# Patient Record
Sex: FEMALE | Race: BLACK OR AFRICAN AMERICAN | NOT HISPANIC OR LATINO | Employment: FULL TIME | URBAN - METROPOLITAN AREA
[De-identification: names, ages, dates, MRNs, and addresses within clinical notes are randomized per-mention and may not be internally consistent; named-entity substitution may affect disease eponyms.]

---

## 2020-09-06 ENCOUNTER — HOSPITAL ENCOUNTER (EMERGENCY)
Facility: HOSPITAL | Age: 65
Discharge: HOME | End: 2020-09-06
Attending: EMERGENCY MEDICINE
Payer: COMMERCIAL

## 2020-09-06 VITALS
TEMPERATURE: 98 F | SYSTOLIC BLOOD PRESSURE: 118 MMHG | DIASTOLIC BLOOD PRESSURE: 72 MMHG | HEART RATE: 68 BPM | WEIGHT: 170 LBS | BODY MASS INDEX: 26.68 KG/M2 | HEIGHT: 67 IN | RESPIRATION RATE: 22 BRPM | OXYGEN SATURATION: 95 %

## 2020-09-06 DIAGNOSIS — U07.1 COVID-19 VIRUS INFECTION: Primary | ICD-10-CM

## 2020-09-06 LAB
ALBUMIN SERPL BCP-MCNC: 3.7 G/DL (ref 3.5–5.2)
ALP SERPL-CCNC: 60 U/L (ref 55–135)
ALT SERPL W/O P-5'-P-CCNC: 29 U/L (ref 10–44)
ANION GAP SERPL CALC-SCNC: 14 MMOL/L (ref 8–16)
AST SERPL-CCNC: 37 U/L (ref 10–40)
BASOPHILS # BLD AUTO: 0.01 K/UL (ref 0–0.2)
BASOPHILS NFR BLD: 0.3 % (ref 0–1.9)
BILIRUB SERPL-MCNC: 0.3 MG/DL (ref 0.1–1)
BUN SERPL-MCNC: 16 MG/DL (ref 8–23)
CALCIUM SERPL-MCNC: 9 MG/DL (ref 8.7–10.5)
CHLORIDE SERPL-SCNC: 99 MMOL/L (ref 95–110)
CK SERPL-CCNC: 135 U/L (ref 20–180)
CO2 SERPL-SCNC: 27 MMOL/L (ref 23–29)
CREAT SERPL-MCNC: 1.1 MG/DL (ref 0.5–1.4)
CRP SERPL-MCNC: 69.8 MG/L (ref 0–8.2)
DIFFERENTIAL METHOD: ABNORMAL
EOSINOPHIL # BLD AUTO: 0 K/UL (ref 0–0.5)
EOSINOPHIL NFR BLD: 0 % (ref 0–8)
ERYTHROCYTE [DISTWIDTH] IN BLOOD BY AUTOMATED COUNT: 14.2 % (ref 11.5–14.5)
EST. GFR  (AFRICAN AMERICAN): >60 ML/MIN/1.73 M^2
EST. GFR  (NON AFRICAN AMERICAN): 52 ML/MIN/1.73 M^2
FERRITIN SERPL-MCNC: 173 NG/ML (ref 20–300)
GLUCOSE SERPL-MCNC: 105 MG/DL (ref 70–110)
HCT VFR BLD AUTO: 41.1 % (ref 37–48.5)
HCV AB SERPL QL IA: NEGATIVE
HGB BLD-MCNC: 13.3 G/DL (ref 12–16)
IMM GRANULOCYTES # BLD AUTO: 0.01 K/UL (ref 0–0.04)
IMM GRANULOCYTES NFR BLD AUTO: 0.3 % (ref 0–0.5)
LACTATE SERPL-SCNC: 1 MMOL/L (ref 0.5–2.2)
LDH SERPL L TO P-CCNC: 318 U/L (ref 110–260)
LYMPHOCYTES # BLD AUTO: 0.9 K/UL (ref 1–4.8)
LYMPHOCYTES NFR BLD: 25 % (ref 18–48)
MCH RBC QN AUTO: 28.4 PG (ref 27–31)
MCHC RBC AUTO-ENTMCNC: 32.4 G/DL (ref 32–36)
MCV RBC AUTO: 88 FL (ref 82–98)
MONOCYTES # BLD AUTO: 0.2 K/UL (ref 0.3–1)
MONOCYTES NFR BLD: 5.4 % (ref 4–15)
NEUTROPHILS # BLD AUTO: 2.5 K/UL (ref 1.8–7.7)
NEUTROPHILS NFR BLD: 69 % (ref 38–73)
NRBC BLD-RTO: 0 /100 WBC
PLATELET # BLD AUTO: 121 K/UL (ref 150–350)
PLATELET BLD QL SMEAR: ABNORMAL
PMV BLD AUTO: 14.6 FL (ref 9.2–12.9)
POTASSIUM SERPL-SCNC: 3.6 MMOL/L (ref 3.5–5.1)
PROT SERPL-MCNC: 7.4 G/DL (ref 6–8.4)
RBC # BLD AUTO: 4.69 M/UL (ref 4–5.4)
SODIUM SERPL-SCNC: 140 MMOL/L (ref 136–145)
WBC # BLD AUTO: 3.68 K/UL (ref 3.9–12.7)

## 2020-09-06 PROCEDURE — 99285 EMERGENCY DEPT VISIT HI MDM: CPT | Mod: 25

## 2020-09-06 PROCEDURE — 82550 ASSAY OF CK (CPK): CPT

## 2020-09-06 PROCEDURE — 85025 COMPLETE CBC W/AUTO DIFF WBC: CPT

## 2020-09-06 PROCEDURE — 25000003 PHARM REV CODE 250: Performed by: EMERGENCY MEDICINE

## 2020-09-06 PROCEDURE — 94640 AIRWAY INHALATION TREATMENT: CPT

## 2020-09-06 PROCEDURE — 96360 HYDRATION IV INFUSION INIT: CPT

## 2020-09-06 PROCEDURE — 63600175 PHARM REV CODE 636 W HCPCS: Performed by: EMERGENCY MEDICINE

## 2020-09-06 PROCEDURE — 27000221 HC OXYGEN, UP TO 24 HOURS

## 2020-09-06 PROCEDURE — 96372 THER/PROPH/DIAG INJ SC/IM: CPT | Mod: 59

## 2020-09-06 PROCEDURE — 96361 HYDRATE IV INFUSION ADD-ON: CPT

## 2020-09-06 PROCEDURE — 80053 COMPREHEN METABOLIC PANEL: CPT

## 2020-09-06 PROCEDURE — 36415 COLL VENOUS BLD VENIPUNCTURE: CPT

## 2020-09-06 PROCEDURE — 86803 HEPATITIS C AB TEST: CPT

## 2020-09-06 PROCEDURE — 25000242 PHARM REV CODE 250 ALT 637 W/ HCPCS: Performed by: EMERGENCY MEDICINE

## 2020-09-06 PROCEDURE — 83605 ASSAY OF LACTIC ACID: CPT

## 2020-09-06 PROCEDURE — 82728 ASSAY OF FERRITIN: CPT

## 2020-09-06 PROCEDURE — 86140 C-REACTIVE PROTEIN: CPT

## 2020-09-06 PROCEDURE — 83615 LACTATE (LD) (LDH) ENZYME: CPT

## 2020-09-06 PROCEDURE — 99900035 HC TECH TIME PER 15 MIN (STAT)

## 2020-09-06 RX ORDER — DEXAMETHASONE SODIUM PHOSPHATE 4 MG/ML
6 INJECTION, SOLUTION INTRA-ARTICULAR; INTRALESIONAL; INTRAMUSCULAR; INTRAVENOUS; SOFT TISSUE
Status: COMPLETED | OUTPATIENT
Start: 2020-09-06 | End: 2020-09-06

## 2020-09-06 RX ORDER — IPRATROPIUM BROMIDE AND ALBUTEROL SULFATE 2.5; .5 MG/3ML; MG/3ML
3 SOLUTION RESPIRATORY (INHALATION)
Status: COMPLETED | OUTPATIENT
Start: 2020-09-06 | End: 2020-09-06

## 2020-09-06 RX ORDER — ALBUTEROL SULFATE 90 UG/1
2 AEROSOL, METERED RESPIRATORY (INHALATION) EVERY 4 HOURS PRN
Qty: 3.5 G | Refills: 0 | Status: SHIPPED | OUTPATIENT
Start: 2020-09-06 | End: 2021-09-06

## 2020-09-06 RX ORDER — METFORMIN HYDROCHLORIDE 850 MG/1
750 TABLET ORAL DAILY
COMMUNITY

## 2020-09-06 RX ORDER — PRAVASTATIN SODIUM 40 MG/1
40 TABLET ORAL DAILY
COMMUNITY

## 2020-09-06 RX ORDER — OLMESARTAN MEDOXOMIL 20 MG/1
20 TABLET ORAL DAILY
COMMUNITY

## 2020-09-06 RX ORDER — BENZONATATE 100 MG/1
200 CAPSULE ORAL 3 TIMES DAILY PRN
Qty: 20 CAPSULE | Refills: 0 | Status: SHIPPED | OUTPATIENT
Start: 2020-09-06 | End: 2020-09-16

## 2020-09-06 RX ADMIN — IPRATROPIUM BROMIDE AND ALBUTEROL SULFATE 3 ML: .5; 3 SOLUTION RESPIRATORY (INHALATION) at 12:09

## 2020-09-06 RX ADMIN — DEXAMETHASONE SODIUM PHOSPHATE 6 MG: 4 INJECTION, SOLUTION INTRAMUSCULAR; INTRAVENOUS at 12:09

## 2020-09-06 RX ADMIN — SODIUM CHLORIDE 500 ML: 0.9 INJECTION, SOLUTION INTRAVENOUS at 12:09

## 2020-09-06 NOTE — ED NOTES
Pt O2  Dropped to 91%   ,     Reported to Dr Sawant:     Accessed  NC incorrect position,   Reposition    sats increased

## 2020-09-06 NOTE — ED PROVIDER NOTES
SCRIBE #1 NOTE: I, Trinity Weber, am scribing for, and in the presence of, Artur Sawant Jr., MD. I have scribed the entire note.      History      Chief Complaint   Patient presents with    COVID-19 Concerns     Tested positive for covid today at urgent care. Sent here for low O2 sat. Pt c/o headache. Denies SOB.        Review of patient's allergies indicates:   Allergen Reactions    Augmentin [amoxicillin-pot clavulanate] Swelling        HPI   HPI    9/6/2020, 11:50 AM   History obtained from the patient      History of Present Illness: Yareli Mathew is a 65 y.o. female patient with a PMHx of HTN and DM who presents to the Emergency Department with COVID-19 concerns. Patient tested positive for COVID today while being evaluated at urgent care. Patient was referred to the ED for further evaluation to address her low O2 sat. She is just c/o a headache at this time. Symptoms are moderate in severity. No mitigating or exacerbating factors reported. Patient denies any fever, N/V, abdominal pain, cough, SOB, chest pain, and all other sxs at this time. Patient does not have a history of lung disease nor kidney disease. No further complaints or concerns at this time.       Arrival mode: Personal vehicle     PCP: Karlos Kovacs DO     Past Medical History:  History reviewed. No pertinent medical history.    Past Surgical History:  History reviewed. No pertinent surgical history.    Family History:  History reviewed. No pertinent family history.    Social History:  Social History Main Topics    Smoking status: Unknown if ever smoked    Smokeless tobacco: Unknown if ever used    Alcohol Use: Unknown drinking history    Drug Use: Unknown if ever used    Sexual Activity: Unknown       ROS   Review of Systems   Constitutional: Negative for fever.   HENT: Negative for sore throat.    Respiratory: Negative for cough and shortness of breath.    Cardiovascular: Negative for chest pain.   Gastrointestinal: Negative for  "abdominal pain, nausea and vomiting.   Genitourinary: Negative for dysuria.   Musculoskeletal: Negative for back pain.   Skin: Negative for rash.   Neurological: Positive for headaches. Negative for weakness.   Hematological: Does not bruise/bleed easily.   All other systems reviewed and are negative.    Physical Exam      Initial Vitals [09/06/20 1141]   BP Pulse Resp Temp SpO2   136/81 106 20 (!) 100.5 °F (38.1 °C) (!) 94 %      MAP       --          Physical Exam  Nursing Notes and Vital Signs Reviewed.  Constitutional: Patient is in no acute distress. Well-developed and well-nourished.  Head: Atraumatic. Normocephalic.  Eyes: PERRL. EOM intact. Conjunctivae are not pale. No scleral icterus.  ENT: Mucous membranes are moist.   Neck: Supple. Full ROM. No lymphadenopathy.  Cardiovascular: Regular rate. Regular rhythm. No murmurs, rubs, or gallops. Distal pulses are 2+ and symmetric.  Pulmonary/Chest: No respiratory distress. Clear to auscultation bilaterally. No wheezing or rales.  Abdominal: Soft and non-distended.  There is no tenderness.    Musculoskeletal: Moves all extremities. No obvious deformities. No edema.   Skin: Warm and dry.  Neurological:  Alert, awake, and appropriate.  Normal speech.  No acute focal neurological deficits are appreciated.  Psychiatric: Normal affect. Good eye contact. Appropriate in content.    ED Course    Procedures  ED Vital Signs:  Vitals:    09/06/20 1141 09/06/20 1200 09/06/20 1205 09/06/20 1210   BP: 136/81      Pulse: 106 98 100 101   Resp: 20 20 20 (!) 21   Temp: (!) 100.5 °F (38.1 °C)      TempSrc: Oral      SpO2: (!) 94% 100%     Weight: 77.1 kg (170 lb)      Height: 5' 7" (1.702 m)       09/06/20 1306 09/06/20 1401 09/06/20 1501 09/06/20 1522   BP:  (!) 116/59 114/65 120/60   Pulse:  105 101 100   Resp:  (!) 22  (!) 24   Temp: 98.2 °F (36.8 °C)      TempSrc: Tympanic      SpO2:  97% (!) 90% (!) 91%   Weight:       Height:           Abnormal Lab Results:  Labs Reviewed "   CBC W/ AUTO DIFFERENTIAL - Abnormal; Notable for the following components:       Result Value    WBC 3.68 (*)     Platelets 121 (*)     MPV 14.6 (*)     Lymph # 0.9 (*)     Mono # 0.2 (*)     Platelet Estimate Decreased (*)     All other components within normal limits   COMPREHENSIVE METABOLIC PANEL - Abnormal; Notable for the following components:    eGFR if non  52 (*)     All other components within normal limits   C-REACTIVE PROTEIN - Abnormal; Notable for the following components:    CRP 69.8 (*)     All other components within normal limits   LACTATE DEHYDROGENASE - Abnormal; Notable for the following components:     (*)     All other components within normal limits   HEPATITIS C ANTIBODY   CK   LACTIC ACID, PLASMA   FERRITIN        All Lab Results:  Results for orders placed or performed during the hospital encounter of 09/06/20   Hepatitis C antibody   Result Value Ref Range    Hepatitis C Ab Negative Negative   CBC auto differential   Result Value Ref Range    WBC 3.68 (L) 3.90 - 12.70 K/uL    RBC 4.69 4.00 - 5.40 M/uL    Hemoglobin 13.3 12.0 - 16.0 g/dL    Hematocrit 41.1 37.0 - 48.5 %    Mean Corpuscular Volume 88 82 - 98 fL    Mean Corpuscular Hemoglobin 28.4 27.0 - 31.0 pg    Mean Corpuscular Hemoglobin Conc 32.4 32.0 - 36.0 g/dL    RDW 14.2 11.5 - 14.5 %    Platelets 121 (L) 150 - 350 K/uL    MPV 14.6 (H) 9.2 - 12.9 fL    Immature Granulocytes 0.3 0.0 - 0.5 %    Gran # (ANC) 2.5 1.8 - 7.7 K/uL    Immature Grans (Abs) 0.01 0.00 - 0.04 K/uL    Lymph # 0.9 (L) 1.0 - 4.8 K/uL    Mono # 0.2 (L) 0.3 - 1.0 K/uL    Eos # 0.0 0.0 - 0.5 K/uL    Baso # 0.01 0.00 - 0.20 K/uL    nRBC 0 0 /100 WBC    Gran% 69.0 38.0 - 73.0 %    Lymph% 25.0 18.0 - 48.0 %    Mono% 5.4 4.0 - 15.0 %    Eosinophil% 0.0 0.0 - 8.0 %    Basophil% 0.3 0.0 - 1.9 %    Platelet Estimate Decreased (A)     Differential Method Automated    Comprehensive metabolic panel   Result Value Ref Range    Sodium 140 136 - 145  mmol/L    Potassium 3.6 3.5 - 5.1 mmol/L    Chloride 99 95 - 110 mmol/L    CO2 27 23 - 29 mmol/L    Glucose 105 70 - 110 mg/dL    BUN, Bld 16 8 - 23 mg/dL    Creatinine 1.1 0.5 - 1.4 mg/dL    Calcium 9.0 8.7 - 10.5 mg/dL    Total Protein 7.4 6.0 - 8.4 g/dL    Albumin 3.7 3.5 - 5.2 g/dL    Total Bilirubin 0.3 0.1 - 1.0 mg/dL    Alkaline Phosphatase 60 55 - 135 U/L    AST 37 10 - 40 U/L    ALT 29 10 - 44 U/L    Anion Gap 14 8 - 16 mmol/L    eGFR if African American >60 >60 mL/min/1.73 m^2    eGFR if non African American 52 (A) >60 mL/min/1.73 m^2   C-Reactive Protein   Result Value Ref Range    CRP 69.8 (H) 0.0 - 8.2 mg/L   Lactate Dehydrogenase   Result Value Ref Range     (H) 110 - 260 U/L   CK   Result Value Ref Range     20 - 180 U/L   Lactic Acid, Plasma   Result Value Ref Range    Lactate (Lactic Acid) 1.0 0.5 - 2.2 mmol/L         Imaging Results:  Imaging Results          X-Ray Chest AP Portable (Final result)  Result time 09/06/20 12:14:55    Final result by Dayron Kennedy III, MD (09/06/20 12:14:55)                 Impression:      See above.      Electronically signed by: Dayron Kennedy MD  Date:    09/06/2020  Time:    12:14             Narrative:    EXAMINATION:  XR CHEST AP PORTABLE    CLINICAL HISTORY:  Suspected Covid-19 Virus Infection;    COMPARISON:  None    FINDINGS:  The cardiomediastinal silhouette is within normal limits for AP technique. There are nonspecific streaky reticular opacities and peribronchial thickening in the bilateral mid to lower lung zones which could be senescent, chronic inflammatory airway disease or less likely developing atypical pneumonia.  The remainder of the lungs appear clear of active disease.  No airspace consolidation, pleural effusion or pneumothorax identified.                                        The Emergency Provider reviewed the vital signs and test results, which are outlined above.    ED Discussion     4:28 PM: Reassessed pt at this time.  Discussed with pt all pertinent ED information and results. Discussed pt dx and plan of tx. Gave pt all f/u and return to the ED instructions. All questions and concerns were addressed at this time. Pt expresses understanding of information and instructions, and is comfortable with plan to discharge. Pt is stable for discharge.    I discussed with patient and/or family/caretaker that evaluation in the ED does not suggest any emergent or life threatening medical conditions requiring immediate intervention beyond what was provided in the ED, and I believe patient is safe for discharge.  Regardless, an unremarkable evaluation in the ED does not preclude the development or presence of a serious of life threatening condition. As such, patient was instructed to return immediately for any worsening or change in current symptoms.       4:38 PM  Patient is stable nontoxic.  She is not hypoxic and is very knowledgeable in her condition.  Patient is stable safe for discharge home.  She is COVID positive.  I discussed with her all findings well as the plan of care.  She verbalized understanding agreement and seems reliable.    ED Medication(s):  Medications   sodium chloride 0.9% bolus 500 mL (500 mLs Intravenous New Bag 9/6/20 1231)   albuterol-ipratropium 2.5 mg-0.5 mg/3 mL nebulizer solution 3 mL (3 mLs Nebulization Given 9/6/20 1210)   dexamethasone injection 6 mg (6 mg Intramuscular Given 9/6/20 1231)     Current Discharge Medication List      START taking these medications    Details   albuterol (PROVENTIL/VENTOLIN HFA) 90 mcg/actuation inhaler Inhale 2 puffs into the lungs every 4 (four) hours as needed. Take 2 puffs of the lungs every 4 hr as needed for wheezing or shortness of breath  Qty: 3.5 g, Refills: 0      benzonatate (TESSALON) 100 MG capsule Take 2 capsules (200 mg total) by mouth 3 (three) times daily as needed for Cough.  Qty: 20 capsule, Refills: 0             Follow-up Information     Karlos Kovacs, DO In 2  days.    Contact information:  [address] [id number] [phone]                     Medical Decision Making    Medical Decision Making:   Clinical Tests:   Lab Tests: Ordered and Reviewed  Radiological Study: Ordered and Reviewed           Scribe Attestation:   Scribe #1: I performed the above scribed service and the documentation accurately describes the services I performed. I attest to the accuracy of the note.    Attending:   Physician Attestation Statement for Scribe #1: I, Artur Sawant Jr., MD, personally performed the services described in this documentation, as scribed by Trinity Weber, in my presence, and it is both accurate and complete.          Clinical Impression       ICD-10-CM ICD-9-CM   1. COVID-19 virus infection  U07.1        Disposition:   Disposition: Discharged  Condition: Stable         Artur Sawant Jr., MD  09/06/20 1638       Artur Sawant Jr., MD  09/06/20 1643

## 2020-09-13 ENCOUNTER — NURSE TRIAGE (OUTPATIENT)
Dept: ADMINISTRATIVE | Facility: CLINIC | Age: 65
End: 2020-09-13

## 2020-09-13 ENCOUNTER — HOSPITAL ENCOUNTER (INPATIENT)
Facility: HOSPITAL | Age: 65
LOS: 1 days | Discharge: HOME | DRG: 177 | End: 2020-09-15
Attending: EMERGENCY MEDICINE | Admitting: INTERNAL MEDICINE
Payer: COMMERCIAL

## 2020-09-13 DIAGNOSIS — R06.02 SOB (SHORTNESS OF BREATH): ICD-10-CM

## 2020-09-13 DIAGNOSIS — R06.02 SHORTNESS OF BREATH: ICD-10-CM

## 2020-09-13 DIAGNOSIS — R07.9 CHEST PAIN, UNSPECIFIED TYPE: ICD-10-CM

## 2020-09-13 DIAGNOSIS — R09.02 HYPOXIA: ICD-10-CM

## 2020-09-13 DIAGNOSIS — R07.9 CHEST PAIN: ICD-10-CM

## 2020-09-13 DIAGNOSIS — Z20.822 SUSPECTED COVID-19 VIRUS INFECTION: ICD-10-CM

## 2020-09-13 DIAGNOSIS — U07.1 COVID-19 VIRUS INFECTION: Primary | ICD-10-CM

## 2020-09-13 PROBLEM — E11.9 DIABETES: Status: ACTIVE | Noted: 2020-09-13

## 2020-09-13 PROBLEM — I10 ESSENTIAL HYPERTENSION: Status: ACTIVE | Noted: 2020-09-13

## 2020-09-13 PROBLEM — R79.89 ELEVATED TROPONIN: Status: ACTIVE | Noted: 2020-09-13

## 2020-09-13 LAB
ALBUMIN SERPL BCP-MCNC: 2.9 G/DL (ref 3.5–5.2)
ALLENS TEST: ABNORMAL
ALP SERPL-CCNC: 94 U/L (ref 55–135)
ALT SERPL W/O P-5'-P-CCNC: 53 U/L (ref 10–44)
ANION GAP SERPL CALC-SCNC: 12 MMOL/L (ref 8–16)
APTT BLDCRRT: 32.3 SEC (ref 21–32)
AST SERPL-CCNC: 49 U/L (ref 10–40)
BASOPHILS # BLD AUTO: 0.02 K/UL (ref 0–0.2)
BASOPHILS NFR BLD: 0.4 % (ref 0–1.9)
BILIRUB SERPL-MCNC: 0.5 MG/DL (ref 0.1–1)
BNP SERPL-MCNC: 14 PG/ML (ref 0–99)
BUN SERPL-MCNC: 10 MG/DL (ref 8–23)
CALCIUM SERPL-MCNC: 8.8 MG/DL (ref 8.7–10.5)
CHLORIDE SERPL-SCNC: 101 MMOL/L (ref 95–110)
CK SERPL-CCNC: 102 U/L (ref 20–180)
CO2 SERPL-SCNC: 27 MMOL/L (ref 23–29)
CREAT SERPL-MCNC: 0.8 MG/DL (ref 0.5–1.4)
CRP SERPL-MCNC: 89.8 MG/L (ref 0–8.2)
D DIMER PPP IA.FEU-MCNC: 0.55 MG/L FEU
DELSYS: ABNORMAL
DIFFERENTIAL METHOD: ABNORMAL
EOSINOPHIL # BLD AUTO: 0 K/UL (ref 0–0.5)
EOSINOPHIL NFR BLD: 0.2 % (ref 0–8)
ERYTHROCYTE [DISTWIDTH] IN BLOOD BY AUTOMATED COUNT: 14.3 % (ref 11.5–14.5)
ERYTHROCYTE [SEDIMENTATION RATE] IN BLOOD BY WESTERGREN METHOD: 59 MM/HR (ref 0–20)
EST. GFR  (AFRICAN AMERICAN): >60 ML/MIN/1.73 M^2
EST. GFR  (NON AFRICAN AMERICAN): >60 ML/MIN/1.73 M^2
FIO2: 28
FLOW: 2
GLUCOSE SERPL-MCNC: 99 MG/DL (ref 70–110)
HCO3 UR-SCNC: 27.9 MMOL/L (ref 24–28)
HCT VFR BLD AUTO: 40.2 % (ref 37–48.5)
HGB BLD-MCNC: 13 G/DL (ref 12–16)
IMM GRANULOCYTES # BLD AUTO: 0.05 K/UL (ref 0–0.04)
IMM GRANULOCYTES NFR BLD AUTO: 1 % (ref 0–0.5)
INR PPP: 1.1 (ref 0.8–1.2)
LACTATE SERPL-SCNC: 1.1 MMOL/L (ref 0.5–2.2)
LDH SERPL L TO P-CCNC: 567 U/L (ref 110–260)
LYMPHOCYTES # BLD AUTO: 1.1 K/UL (ref 1–4.8)
LYMPHOCYTES NFR BLD: 20.3 % (ref 18–48)
MCH RBC QN AUTO: 28 PG (ref 27–31)
MCHC RBC AUTO-ENTMCNC: 32.3 G/DL (ref 32–36)
MCV RBC AUTO: 87 FL (ref 82–98)
MODE: ABNORMAL
MONOCYTES # BLD AUTO: 0.4 K/UL (ref 0.3–1)
MONOCYTES NFR BLD: 7 % (ref 4–15)
NEUTROPHILS # BLD AUTO: 3.7 K/UL (ref 1.8–7.7)
NEUTROPHILS NFR BLD: 71.1 % (ref 38–73)
NRBC BLD-RTO: 0 /100 WBC
OVALOCYTES BLD QL SMEAR: ABNORMAL
PCO2 BLDA: 38.4 MMHG (ref 35–45)
PH SMN: 7.47 [PH] (ref 7.35–7.45)
PLATELET # BLD AUTO: 290 K/UL (ref 150–350)
PLATELET BLD QL SMEAR: ABNORMAL
PMV BLD AUTO: 11.9 FL (ref 9.2–12.9)
PO2 BLDA: 80 MMHG (ref 80–100)
POC BE: 4 MMOL/L
POC SATURATED O2: 96 % (ref 95–100)
POCT GLUCOSE: 94 MG/DL (ref 70–110)
POIKILOCYTOSIS BLD QL SMEAR: SLIGHT
POTASSIUM SERPL-SCNC: 3.6 MMOL/L (ref 3.5–5.1)
PROCALCITONIN SERPL IA-MCNC: 4.19 NG/ML
PROT SERPL-MCNC: 7.3 G/DL (ref 6–8.4)
PROTHROMBIN TIME: 11.2 SEC (ref 9–12.5)
RBC # BLD AUTO: 4.65 M/UL (ref 4–5.4)
SAMPLE: ABNORMAL
SARS-COV-2 RDRP RESP QL NAA+PROBE: NEGATIVE
SITE: ABNORMAL
SODIUM SERPL-SCNC: 140 MMOL/L (ref 136–145)
STOMATOCYTES BLD QL SMEAR: PRESENT
TROPONIN I SERPL DL<=0.01 NG/ML-MCNC: 0.01 NG/ML (ref 0–0.03)
TROPONIN I SERPL DL<=0.01 NG/ML-MCNC: 0.03 NG/ML (ref 0–0.03)
WBC # BLD AUTO: 5.26 K/UL (ref 3.9–12.7)

## 2020-09-13 PROCEDURE — 93010 ELECTROCARDIOGRAM REPORT: CPT | Mod: ,,, | Performed by: INTERNAL MEDICINE

## 2020-09-13 PROCEDURE — 86140 C-REACTIVE PROTEIN: CPT

## 2020-09-13 PROCEDURE — 84145 PROCALCITONIN (PCT): CPT

## 2020-09-13 PROCEDURE — 96366 THER/PROPH/DIAG IV INF ADDON: CPT

## 2020-09-13 PROCEDURE — 96365 THER/PROPH/DIAG IV INF INIT: CPT

## 2020-09-13 PROCEDURE — 82803 BLOOD GASES ANY COMBINATION: CPT

## 2020-09-13 PROCEDURE — U0002 COVID-19 LAB TEST NON-CDC: HCPCS

## 2020-09-13 PROCEDURE — 85610 PROTHROMBIN TIME: CPT

## 2020-09-13 PROCEDURE — 85651 RBC SED RATE NONAUTOMATED: CPT

## 2020-09-13 PROCEDURE — G0378 HOSPITAL OBSERVATION PER HR: HCPCS

## 2020-09-13 PROCEDURE — 83880 ASSAY OF NATRIURETIC PEPTIDE: CPT

## 2020-09-13 PROCEDURE — 87040 BLOOD CULTURE FOR BACTERIA: CPT | Mod: 59

## 2020-09-13 PROCEDURE — 83036 HEMOGLOBIN GLYCOSYLATED A1C: CPT

## 2020-09-13 PROCEDURE — 85379 FIBRIN DEGRADATION QUANT: CPT

## 2020-09-13 PROCEDURE — 25000242 PHARM REV CODE 250 ALT 637 W/ HCPCS: Performed by: NURSE PRACTITIONER

## 2020-09-13 PROCEDURE — 93010 EKG 12-LEAD: ICD-10-PCS | Mod: ,,, | Performed by: INTERNAL MEDICINE

## 2020-09-13 PROCEDURE — 80053 COMPREHEN METABOLIC PANEL: CPT

## 2020-09-13 PROCEDURE — 83605 ASSAY OF LACTIC ACID: CPT

## 2020-09-13 PROCEDURE — 94640 AIRWAY INHALATION TREATMENT: CPT

## 2020-09-13 PROCEDURE — 82728 ASSAY OF FERRITIN: CPT

## 2020-09-13 PROCEDURE — 36600 WITHDRAWAL OF ARTERIAL BLOOD: CPT

## 2020-09-13 PROCEDURE — 63700000 PHARM REV CODE 250 ALT 637 W/O HCPCS: Performed by: NURSE PRACTITIONER

## 2020-09-13 PROCEDURE — 82550 ASSAY OF CK (CPK): CPT

## 2020-09-13 PROCEDURE — 85025 COMPLETE CBC W/AUTO DIFF WBC: CPT

## 2020-09-13 PROCEDURE — 63600175 PHARM REV CODE 636 W HCPCS: Performed by: EMERGENCY MEDICINE

## 2020-09-13 PROCEDURE — 25000003 PHARM REV CODE 250: Performed by: NURSE PRACTITIONER

## 2020-09-13 PROCEDURE — 84484 ASSAY OF TROPONIN QUANT: CPT

## 2020-09-13 PROCEDURE — 85730 THROMBOPLASTIN TIME PARTIAL: CPT

## 2020-09-13 PROCEDURE — 36415 COLL VENOUS BLD VENIPUNCTURE: CPT

## 2020-09-13 PROCEDURE — 99291 CRITICAL CARE FIRST HOUR: CPT | Mod: 25

## 2020-09-13 PROCEDURE — 83615 LACTATE (LD) (LDH) ENZYME: CPT

## 2020-09-13 PROCEDURE — 93005 ELECTROCARDIOGRAM TRACING: CPT

## 2020-09-13 PROCEDURE — 94761 N-INVAS EAR/PLS OXIMETRY MLT: CPT

## 2020-09-13 PROCEDURE — 99900035 HC TECH TIME PER 15 MIN (STAT)

## 2020-09-13 RX ORDER — METOPROLOL TARTRATE 25 MG/1
25 TABLET, FILM COATED ORAL 2 TIMES DAILY
Status: DISCONTINUED | OUTPATIENT
Start: 2020-09-13 | End: 2020-09-15 | Stop reason: HOSPADM

## 2020-09-13 RX ORDER — ALBUTEROL SULFATE 90 UG/1
2 AEROSOL, METERED RESPIRATORY (INHALATION) EVERY 6 HOURS
Status: DISCONTINUED | OUTPATIENT
Start: 2020-09-13 | End: 2020-09-15 | Stop reason: HOSPADM

## 2020-09-13 RX ORDER — ASPIRIN 325 MG
325 TABLET ORAL DAILY
Status: DISCONTINUED | OUTPATIENT
Start: 2020-09-14 | End: 2020-09-13

## 2020-09-13 RX ORDER — CHOLECALCIFEROL (VITAMIN D3) 25 MCG
1000 TABLET ORAL DAILY
Status: DISCONTINUED | OUTPATIENT
Start: 2020-09-14 | End: 2020-09-15 | Stop reason: HOSPADM

## 2020-09-13 RX ORDER — PRAVASTATIN SODIUM 20 MG/1
40 TABLET ORAL DAILY
Status: DISCONTINUED | OUTPATIENT
Start: 2020-09-13 | End: 2020-09-15 | Stop reason: HOSPADM

## 2020-09-13 RX ORDER — INSULIN ASPART 100 [IU]/ML
1-10 INJECTION, SOLUTION INTRAVENOUS; SUBCUTANEOUS
Status: DISCONTINUED | OUTPATIENT
Start: 2020-09-13 | End: 2020-09-15 | Stop reason: HOSPADM

## 2020-09-13 RX ORDER — IBUPROFEN 200 MG
16 TABLET ORAL
Status: DISCONTINUED | OUTPATIENT
Start: 2020-09-13 | End: 2020-09-15 | Stop reason: HOSPADM

## 2020-09-13 RX ORDER — IBUPROFEN 200 MG
24 TABLET ORAL
Status: DISCONTINUED | OUTPATIENT
Start: 2020-09-13 | End: 2020-09-15 | Stop reason: HOSPADM

## 2020-09-13 RX ORDER — ASCORBIC ACID 500 MG
500 TABLET ORAL 2 TIMES DAILY
Status: DISCONTINUED | OUTPATIENT
Start: 2020-09-13 | End: 2020-09-15 | Stop reason: HOSPADM

## 2020-09-13 RX ORDER — ACETAMINOPHEN 325 MG/1
650 TABLET ORAL EVERY 8 HOURS PRN
Status: DISCONTINUED | OUTPATIENT
Start: 2020-09-13 | End: 2020-09-15 | Stop reason: HOSPADM

## 2020-09-13 RX ORDER — AZITHROMYCIN 250 MG/1
500 TABLET, FILM COATED ORAL
Status: DISCONTINUED | OUTPATIENT
Start: 2020-09-13 | End: 2020-09-15 | Stop reason: HOSPADM

## 2020-09-13 RX ORDER — ONDANSETRON 2 MG/ML
4 INJECTION INTRAMUSCULAR; INTRAVENOUS EVERY 8 HOURS PRN
Status: DISCONTINUED | OUTPATIENT
Start: 2020-09-13 | End: 2020-09-15 | Stop reason: HOSPADM

## 2020-09-13 RX ORDER — HEPARIN SODIUM,PORCINE/D5W 25000/250
12 INTRAVENOUS SOLUTION INTRAVENOUS CONTINUOUS
Status: DISCONTINUED | OUTPATIENT
Start: 2020-09-13 | End: 2020-09-15 | Stop reason: HOSPADM

## 2020-09-13 RX ORDER — NAPROXEN SODIUM 220 MG/1
81 TABLET, FILM COATED ORAL DAILY
Status: DISCONTINUED | OUTPATIENT
Start: 2020-09-14 | End: 2020-09-15 | Stop reason: HOSPADM

## 2020-09-13 RX ORDER — SODIUM CHLORIDE 0.9 % (FLUSH) 0.9 %
10 SYRINGE (ML) INJECTION
Status: DISCONTINUED | OUTPATIENT
Start: 2020-09-13 | End: 2020-09-15 | Stop reason: HOSPADM

## 2020-09-13 RX ORDER — ASPIRIN 325 MG
325 TABLET ORAL ONCE
Status: COMPLETED | OUTPATIENT
Start: 2020-09-13 | End: 2020-09-13

## 2020-09-13 RX ORDER — GLUCAGON 1 MG
1 KIT INJECTION
Status: DISCONTINUED | OUTPATIENT
Start: 2020-09-13 | End: 2020-09-15 | Stop reason: HOSPADM

## 2020-09-13 RX ORDER — LOSARTAN POTASSIUM 50 MG/1
50 TABLET ORAL DAILY
Status: DISCONTINUED | OUTPATIENT
Start: 2020-09-14 | End: 2020-09-15 | Stop reason: HOSPADM

## 2020-09-13 RX ORDER — GLUCAGON 1 MG
1 KIT INJECTION
Status: DISCONTINUED | OUTPATIENT
Start: 2020-09-13 | End: 2020-09-13 | Stop reason: SDUPTHER

## 2020-09-13 RX ORDER — BENZONATATE 100 MG/1
100 CAPSULE ORAL 3 TIMES DAILY PRN
Status: DISCONTINUED | OUTPATIENT
Start: 2020-09-13 | End: 2020-09-15 | Stop reason: HOSPADM

## 2020-09-13 RX ORDER — IBUPROFEN 200 MG
16 TABLET ORAL
Status: DISCONTINUED | OUTPATIENT
Start: 2020-09-13 | End: 2020-09-13 | Stop reason: SDUPTHER

## 2020-09-13 RX ORDER — IBUPROFEN 200 MG
24 TABLET ORAL
Status: DISCONTINUED | OUTPATIENT
Start: 2020-09-13 | End: 2020-09-13 | Stop reason: SDUPTHER

## 2020-09-13 RX ADMIN — OXYCODONE HYDROCHLORIDE AND ACETAMINOPHEN 500 MG: 500 TABLET ORAL at 09:09

## 2020-09-13 RX ADMIN — ASPIRIN 325 MG: 325 TABLET ORAL at 07:09

## 2020-09-13 RX ADMIN — PRAVASTATIN SODIUM 40 MG: 20 TABLET ORAL at 09:09

## 2020-09-13 RX ADMIN — AZITHROMYCIN MONOHYDRATE 500 MG: 250 TABLET ORAL at 07:09

## 2020-09-13 RX ADMIN — METOPROLOL TARTRATE 25 MG: 25 TABLET, FILM COATED ORAL at 09:09

## 2020-09-13 RX ADMIN — ALBUTEROL SULFATE 2 PUFF: 90 AEROSOL, METERED RESPIRATORY (INHALATION) at 07:09

## 2020-09-13 RX ADMIN — HEPARIN SODIUM AND DEXTROSE 12 UNITS/KG/HR: 10000; 5 INJECTION INTRAVENOUS at 09:09

## 2020-09-13 NOTE — ASSESSMENT & PLAN NOTE
-likely due to demand in the setting of SOB and hypoxia in COVID 19   -serial results pending- initial results 0.029   -Echo results pending   -Cardiology consulted

## 2020-09-13 NOTE — TELEPHONE ENCOUNTER
"Pt c/o worsening SOB 3/5 at rest, inhaler not helping.  Advised to call 911, dont wait for someone to pick her up.  She said she will    Reason for Disposition   Severe difficulty breathing (e.g., struggling for each breath, speaks in single words)   [1] MODERATE difficulty breathing (e.g., speaks in phrases, SOB even at rest, pulse 100-120) AND [2] NEW-onset or WORSE than normal    Additional Information   Negative: Choking on something   Negative: [1] Breathing stopped AND [2] hasn't returned   Negative: Bluish (or gray) lips or face now   Negative: Difficult to awaken or acting confused (e.g., disoriented, slurred speech)   Negative: Passed out (i.e., lost consciousness, collapsed and was not responding)   Negative: Wheezing started suddenly after medicine, an allergic food or bee sting   Negative: Stridor   Negative: Slow, shallow and weak breathing   Negative: Sounds like a life-threatening emergency to the triager   Negative: Chest pain   Negative: [1] Wheezing (high pitched whistling sound) AND [2] previous asthma attacks or use of asthma medicines   Negative: [1] Difficulty breathing AND [2] only present when coughing   Negative: [1] Difficulty breathing AND [2] only from stuffy or runny nose   Negative: [1] Difficulty breathing AND [2] within 14 days of COVID-19 Exposure   Negative: Wheezing can be heard across the room   Negative: Drooling or spitting out saliva (because can't swallow)   Negative: History of prior "blood clot" in leg or lungs (i.e., deep vein thrombosis, pulmonary embolism)   Negative: History of inherited increased risk of blood clots (e.g., Factor 5 Leiden, Anti-thrombin 3, Protein C or Protein S deficiency, Prothrombin mutation)   Negative: Major surgery in the past month   Negative: Hip or leg fracture (broken bone) in past month (or had cast on leg or ankle in past month)   Negative: Illness requiring prolonged bedrest in past month (e.g., immobilization, long " "hospital stay)   Negative: Long-distance travel in past month (e.g., car, bus, train, plane; with trip lasting 6 or more hours)   Negative: Extra heart beats OR irregular heart beating   (i.e., "palpitations")    Protocols used: BREATHING DIFFICULTY-A-AH      "

## 2020-09-13 NOTE — ASSESSMENT & PLAN NOTE
-in the setting of suspected COVID 19 due to + results on 09/6/20  -supplemental oxygen   -Albuterol inhaler   -ABG results pending

## 2020-09-13 NOTE — ED NOTES
Patient sitting up in bed, no acute distress noted, awake, alert, and oriented x 3, calm, respirations even and unlabored, and skin is warm and dry. Patient verbalized understanding of status and plan of care. Patient denies needs at this time. Side rails up x 2, call light in reach, bed low and locked. Family at bedside. Will continue to monitor.

## 2020-09-13 NOTE — ASSESSMENT & PLAN NOTE
Cardiology consult  Statin and ARB continued   Heparin infusion   Serial cardiac enzymes- initial troponin 0.029  beta blocker and ASA  Nitrates prn  Oxygen therapy to maintain sats > 92 %  Lipid panel  2 D ECHO

## 2020-09-13 NOTE — SUBJECTIVE & OBJECTIVE
History reviewed. No pertinent past medical history.    History reviewed. No pertinent surgical history.    Review of patient's allergies indicates:   Allergen Reactions    Augmentin [amoxicillin-pot clavulanate] Swelling       No current facility-administered medications on file prior to encounter.      Current Outpatient Medications on File Prior to Encounter   Medication Sig    albuterol (PROVENTIL/VENTOLIN HFA) 90 mcg/actuation inhaler Inhale 2 puffs into the lungs every 4 (four) hours as needed. Take 2 puffs of the lungs every 4 hr as needed for wheezing or shortness of breath    benzonatate (TESSALON) 100 MG capsule Take 2 capsules (200 mg total) by mouth 3 (three) times daily as needed for Cough.    metFORMIN (GLUCOPHAGE) 850 MG tablet Take 750 mg by mouth once daily.    olmesartan (BENICAR) 20 MG tablet Take 20 mg by mouth once daily.    pravastatin (PRAVACHOL) 40 MG tablet Take 40 mg by mouth once daily.     Family History     None        Tobacco Use    Smoking status: Never Smoker   Substance and Sexual Activity    Alcohol use: Not Currently     Frequency: Never    Drug use: Not on file    Sexual activity: Not on file     Review of Systems   Constitutional: Positive for activity change and fatigue. Negative for appetite change and fever.   HENT: Negative for congestion, postnasal drip, sinus pressure, sore throat and trouble swallowing.    Eyes: Negative.    Respiratory: Positive for shortness of breath. Negative for cough and wheezing.    Cardiovascular: Positive for chest pain and palpitations. Negative for leg swelling.   Gastrointestinal: Negative for abdominal distention, abdominal pain, diarrhea, nausea and vomiting.   Endocrine: Negative for cold intolerance and heat intolerance.   Genitourinary: Negative for difficulty urinating, dysuria, flank pain, frequency and urgency.   Musculoskeletal: Negative for arthralgias, back pain, gait problem, myalgias and neck pain.   Skin: Negative for  color change and wound.   Allergic/Immunologic: Negative for environmental allergies and food allergies.   Neurological: Negative for dizziness, weakness and headaches.   Hematological: Does not bruise/bleed easily.   Psychiatric/Behavioral: Negative for agitation, confusion and sleep disturbance. The patient is not nervous/anxious.      Objective:     Vital Signs (Most Recent):  Temp: 98.4 °F (36.9 °C) (09/13/20 1412)  Pulse: 92 (09/13/20 1630)  Resp: 20 (09/13/20 1630)  BP: (!) 145/85 (09/13/20 1630)  SpO2: 98 % (09/13/20 1630) Vital Signs (24h Range):  Temp:  [98.4 °F (36.9 °C)] 98.4 °F (36.9 °C)  Pulse:  [] 92  Resp:  [20-26] 20  SpO2:  [85 %-98 %] 98 %  BP: (137-156)/(81-87) 145/85     Weight: 78.5 kg (173 lb 1 oz)  Body mass index is 27.11 kg/m².    Physical Exam  Vitals signs and nursing note reviewed.   Constitutional:       General: She is not in acute distress.     Appearance: She is not ill-appearing or toxic-appearing.   HENT:      Head: Normocephalic and atraumatic.   Cardiovascular:      Heart sounds: No murmur. No friction rub. No gallop.    Pulmonary:      Effort: No respiratory distress.      Breath sounds: No stridor. No wheezing, rhonchi or rales.   Chest:      Chest wall: No tenderness.   Abdominal:      General: There is no distension.      Palpations: There is no mass.      Tenderness: There is no abdominal tenderness. There is no left CVA tenderness, guarding or rebound.      Hernia: No hernia is present.   Musculoskeletal: Normal range of motion.         General: No swelling, tenderness, deformity or signs of injury.      Right lower leg: No edema.   Skin:     Capillary Refill: Capillary refill takes less than 2 seconds.      Coloration: Skin is not jaundiced or pale.      Findings: No bruising, erythema, lesion or rash.   Neurological:      Cranial Nerves: No cranial nerve deficit.      Sensory: No sensory deficit.      Motor: No weakness.      Coordination: Coordination normal.       Gait: Gait normal.      Deep Tendon Reflexes: Reflexes normal.             Significant Labs:   CBC:   Recent Labs   Lab 09/13/20  1455   WBC 5.26   HGB 13.0   HCT 40.2        CMP:   Recent Labs   Lab 09/13/20  1455      K 3.6      CO2 27   GLU 99   BUN 10   CREATININE 0.8   CALCIUM 8.8   PROT 7.3   ALBUMIN 2.9*   BILITOT 0.5   ALKPHOS 94   AST 49*   ALT 53*   ANIONGAP 12   EGFRNONAA >60     Troponin:   Recent Labs   Lab 09/13/20  1455   TROPONINI 0.029*       Significant Imaging:   Imaging Results          X-Ray Chest AP Portable (Final result)  Result time 09/13/20 15:18:08    Final result by Erlin Garcia MD (09/13/20 15:18:08)                 Impression:      New mild bilateral perihilar ground-glass infiltrates may reflect edema or pneumonitis.      Electronically signed by: Erlin Garcia  Date:    09/13/2020  Time:    15:18             Narrative:    EXAMINATION:  XR CHEST AP PORTABLE    CLINICAL HISTORY:  CHF;    TECHNIQUE:  Single frontal view of the chest was performed.    COMPARISON:  Chest radiograph 09/06/2020    FINDINGS:  Cardiac leads project over the chest.  Cardiomediastinal silhouette is within normal limits for size and unchanged.  Aortic atherosclerotic calcifications.  New mild bilateral perihilar ground-glass infiltrates.  No large consolidation.  No large effusion.  No pneumothorax.

## 2020-09-13 NOTE — H&P
[facility] Hospital Medicine  History & Physical    Patient Name: Yareli Mathew  MRN: [id number]  Admission Date: 9/13/2020  Attending Physician: Dwight Moseley MD  Primary Care Provider: Karlos Kovacs DO         Patient information was obtained from patient and ER records.     Subjective:     Principal Problem:SOB (shortness of breath)    Chief Complaint:   Chief Complaint   Patient presents with    Shortness of Breath     COVID+ last Sunday. Pt c/o worsening SOB.         HPI: Yareli Mathew is a 64 yo female with PMHx of HTN and DM who presented to ED for SOB x 1 week with progressive worsening in the setting COVID 19 (tested positive on 9/6/20).  Associated symptoms include: fatigue, decreased activity, palpitations, and CP (intermittent).  Pt denies HA, dizziness, nausea, vomiting, abdominal pain, diarrhea, and additional complaints.  Pt is a full code and sister is the surrogate decision maker.  ER work up showed: AST 49/ALT 53 and Troponin 0.029.  Chest xray showed new mild bilateral perihilar ground-glass infiltrates which may reflect edema or pneumonitis.  COVID 19 negative in ED however patient tested positive 1 week prior to admission.  ER discussed case with Cardiology and Heparin infusion initiated.  Hospital Medicine contacted for admission with patient placed in Observation Unit for further evaluation.      History reviewed. No pertinent past medical history.    History reviewed. No pertinent surgical history.    Review of patient's allergies indicates:   Allergen Reactions    Augmentin [amoxicillin-pot clavulanate] Swelling       No current facility-administered medications on file prior to encounter.      Current Outpatient Medications on File Prior to Encounter   Medication Sig    albuterol (PROVENTIL/VENTOLIN HFA) 90 mcg/actuation inhaler Inhale 2 puffs into the lungs every 4 (four) hours as needed. Take 2 puffs of the lungs every 4 hr as needed for wheezing or  shortness of breath    benzonatate (TESSALON) 100 MG capsule Take 2 capsules (200 mg total) by mouth 3 (three) times daily as needed for Cough.    metFORMIN (GLUCOPHAGE) 850 MG tablet Take 750 mg by mouth once daily.    olmesartan (BENICAR) 20 MG tablet Take 20 mg by mouth once daily.    pravastatin (PRAVACHOL) 40 MG tablet Take 40 mg by mouth once daily.     Family History     None        Tobacco Use    Smoking status: Never Smoker   Substance and Sexual Activity    Alcohol use: Not Currently     Frequency: Never    Drug use: Not on file    Sexual activity: Not on file     Review of Systems   Constitutional: Positive for activity change and fatigue. Negative for appetite change and fever.   HENT: Negative for congestion, postnasal drip, sinus pressure, sore throat and trouble swallowing.    Eyes: Negative.    Respiratory: Positive for shortness of breath. Negative for cough and wheezing.    Cardiovascular: Positive for chest pain and palpitations. Negative for leg swelling.   Gastrointestinal: Negative for abdominal distention, abdominal pain, diarrhea, nausea and vomiting.   Endocrine: Negative for cold intolerance and heat intolerance.   Genitourinary: Negative for difficulty urinating, dysuria, flank pain, frequency and urgency.   Musculoskeletal: Negative for arthralgias, back pain, gait problem, myalgias and neck pain.   Skin: Negative for color change and wound.   Allergic/Immunologic: Negative for environmental allergies and food allergies.   Neurological: Negative for dizziness, weakness and headaches.   Hematological: Does not bruise/bleed easily.   Psychiatric/Behavioral: Negative for agitation, confusion and sleep disturbance. The patient is not nervous/anxious.      Objective:     Vital Signs (Most Recent):  Temp: 98.4 °F (36.9 °C) (09/13/20 1412)  Pulse: 92 (09/13/20 1630)  Resp: 20 (09/13/20 1630)  BP: (!) 145/85 (09/13/20 1630)  SpO2: 98 % (09/13/20 1630) Vital Signs (24h Range):  Temp:   [98.4 °F (36.9 °C)] 98.4 °F (36.9 °C)  Pulse:  [] 92  Resp:  [20-26] 20  SpO2:  [85 %-98 %] 98 %  BP: (137-156)/(81-87) 145/85     Weight: 78.5 kg (173 lb 1 oz)  Body mass index is 27.11 kg/m².    Physical Exam  Vitals signs and nursing note reviewed.   Constitutional:       General: She is not in acute distress.     Appearance: She is not ill-appearing or toxic-appearing.   HENT:      Head: Normocephalic and atraumatic.   Cardiovascular:      Heart sounds: No murmur. No friction rub. No gallop.    Pulmonary:      Effort: No respiratory distress.      Breath sounds: No stridor. No wheezing, rhonchi or rales.   Chest:      Chest wall: No tenderness.   Abdominal:      General: There is no distension.      Palpations: There is no mass.      Tenderness: There is no abdominal tenderness. There is no left CVA tenderness, guarding or rebound.      Hernia: No hernia is present.   Musculoskeletal: Normal range of motion.         General: No swelling, tenderness, deformity or signs of injury.      Right lower leg: No edema.   Skin:     Capillary Refill: Capillary refill takes less than 2 seconds.      Coloration: Skin is not jaundiced or pale.      Findings: No bruising, erythema, lesion or rash.   Neurological:      Cranial Nerves: No cranial nerve deficit.      Sensory: No sensory deficit.      Motor: No weakness.      Coordination: Coordination normal.      Gait: Gait normal.      Deep Tendon Reflexes: Reflexes normal.             Significant Labs:   CBC:   Recent Labs   Lab 09/13/20  1455   WBC 5.26   HGB 13.0   HCT 40.2        CMP:   Recent Labs   Lab 09/13/20  1455      K 3.6      CO2 27   GLU 99   BUN 10   CREATININE 0.8   CALCIUM 8.8   PROT 7.3   ALBUMIN 2.9*   BILITOT 0.5   ALKPHOS 94   AST 49*   ALT 53*   ANIONGAP 12   EGFRNONAA >60     Troponin:   Recent Labs   Lab 09/13/20  1455   TROPONINI 0.029*       Significant Imaging:   Imaging Results          X-Ray Chest AP Portable (Final result)   Result time 09/13/20 15:18:08    Final result by Erlin Garcia MD (09/13/20 15:18:08)                 Impression:      New mild bilateral perihilar ground-glass infiltrates may reflect edema or pneumonitis.      Electronically signed by: Erlin Garcia  Date:    09/13/2020  Time:    15:18             Narrative:    EXAMINATION:  XR CHEST AP PORTABLE    CLINICAL HISTORY:  CHF;    TECHNIQUE:  Single frontal view of the chest was performed.    COMPARISON:  Chest radiograph 09/06/2020    FINDINGS:  Cardiac leads project over the chest.  Cardiomediastinal silhouette is within normal limits for size and unchanged.  Aortic atherosclerotic calcifications.  New mild bilateral perihilar ground-glass infiltrates.  No large consolidation.  No large effusion.  No pneumothorax.                              Assessment/Plan:     * SOB (shortness of breath)  -in the setting of suspected COVID 19 due to + results on 09/6/20  -supplemental oxygen   -Albuterol inhaler   -ABG results pending       Suspected Covid-19 Virus Infection  - COVID-19 testing - tested positive on 9/6/20- repeat test in ED negative  -chest xray showed new mild bilateral perihilar ground-glass infiltrates which may reflect edema or pneumonitis  - Infection Control notified     - Isolation:   - Airborne, Contact and Droplet Precautions  - Cohort patients into COVID units  - N95 mask, wear eye protection  - 20 second hand hygiene              - Limit visitors per hospital policy              - Consolidating lab draws, nursing care, provider visits, and interventions    - Diagnostics: (leukopenia, hyponatremia, hyperferritinemia, elevated troponin, elevated d-dimer, age, and comorbidities are significant predictors of poor clinical outcome)  CBC, CMP, Ferritin, CRP, LDH, BNP, Troponin and Portable CXR    - Management:  Supplemental O2 to maintain SpO2 >92%  Telemetry  Continuous/intermittent Pulse Ox  Albuterol treatment PRN    Advance Care Planning   Full Code               Chest pain  Cardiology consult  Statin and ARB continued   Heparin infusion   Serial cardiac enzymes- initial troponin 0.029  beta blocker and ASA  Nitrates prn  Oxygen therapy to maintain sats > 92 %  Lipid panel  2 D ECHO          Diabetes  -Accuchecks and SSI   -will resume cardiac/diabetic diet when appropriate   -HbA1c pending       Essential hypertension  -home medications resumed   -BP mildly elevated in the setting of SOB       Elevated troponin  -likely due to demand in the setting of SOB and hypoxia in COVID 19   -serial results pending- initial results 0.029   -Echo results pending   -Cardiology consulted       VTE Risk Mitigation (From admission, onward)         Ordered     Place sequential compression device  Until discontinued      09/13/20 1830     heparin 25,000 units in dextrose 5% (100 units/ml) IV bolus from bag INITIAL BOLUS (max bolus 4000 units)  Once     Question:  Heparin Infusion Adjustment (DO NOT MODIFY ANSWER)  Answer:  \[email or web address]\[email or web address]\Images\Pharmacy\HeparinInfusions\heparin LOW INTENSITY nomogram for [facility] [id number]    09/13/20 1706     heparin 25,000 units in dextrose 5% 250 mL (100 units/mL) infusion LOW INTENSITY nomogram - [facility]  Continuous     Question:  Heparin Infusion Adjustment (DO NOT MODIFY ANSWER)  Answer:  \[email or web address]\[email or web address]\Images\Pharmacy\HeparinInfusions\heparin LOW INTENSITY nomogram for [facility] [id number]    09/13/20 1706     heparin 25,000 units in dextrose 5% (100 units/ml) IV bolus from bag - ADDITIONAL PRN BOLUS - 60 units/kg (max bolus 4000 units)  As needed (PRN)     Question:  Heparin Infusion Adjustment (DO NOT MODIFY ANSWER)  Answer:  \[email or web address]\epic\Images\Pharmacy\HeparinInfusions\heparin LOW INTENSITY nomogram for [facility] [id number]    09/13/20 1706     heparin 25,000 units in dextrose 5% (100 units/ml) IV bolus from bag - ADDITIONAL PRN BOLUS - 30 units/kg (max bolus 4000 units)  As needed (PRN)     Question:  Heparin Infusion Adjustment (DO NOT  MODIFY ANSWER)  Answer:  [email or web address]\epic\Images\Pharmacy\HeparinInfusions\heparin LOW INTENSITY nomogram for [facility] [id number]    09/13/20 [id number]                   Ned Petersen NP  Department of Hospital Medicine   [facility]

## 2020-09-13 NOTE — HPI
Yareli Mathew is a 64 yo female with PMHx of HTN and DM who presented to ED for SOB x 1 week with progressive worsening in the setting COVID 19 (tested positive on 9/6/20).  Associated symptoms include: fatigue, decreased activity, palpitations, and CP (intermittent).  Pt denies HA, dizziness, nausea, vomiting, abdominal pain, diarrhea, and additional complaints.  Pt is a full code and sister is the surrogate decision maker.  ER work up showed: AST 49/ALT 53 and Troponin 0.029.  Chest xray showed new mild bilateral perihilar ground-glass infiltrates which may reflect edema or pneumonitis.  COVID 19 negative in ED however patient tested positive 1 week prior to admission.  ER discussed case with Cardiology and Heparin infusion initiated.  Hospital Medicine contacted for admission with patient placed in Observation Unit for further evaluation.

## 2020-09-13 NOTE — ED PROVIDER NOTES
SCRIBE #1 NOTE: I, Connie Ocampo, am scribing for, and in the presence of, Dave Doyle Jr., MD. I have scribed the entire note.       History     Chief Complaint   Patient presents with    Shortness of Breath     COVID+ last Sunday. Pt c/o worsening SOB.      Review of patient's allergies indicates:   Allergen Reactions    Augmentin [amoxicillin-pot clavulanate] Swelling         History of Present Illness     HPI    9/13/2020, 3:11 PM  History obtained from the patient      History of Present Illness: Yareli Mathew is a 65 y.o. female patient with a PMHx of DM who presents to the Emergency Department for evaluation of worsening SOB which onset gradually several days PTA. Pt tested positive for COVID-19 1 week PTA. Pt reports that she is not on home oxygen. Pt also c/o brief CP that comes and goes. Symptoms are constant and moderate in severity. No mitigating or exacerbating factors reported. No associated sxs. Patient denies any fever, n/v/d, abd pain, dysuria, HA and all other sxs at this time. No prior Tx. No further complaints or concerns at this time.       Arrival mode: Personal vehicle     PCP: Karlos Kovacs DO        Past Medical History:  Past Medical History:   Diagnosis Date    Diabetes mellitus     HLD (hyperlipidemia)     Hypertension        Past Surgical History:  History reviewed. No pertinent surgical history.      Family History:  History reviewed. No pertinent family history.    Social History:  Social History     Tobacco Use    Smoking status: Never Smoker   Substance and Sexual Activity    Alcohol use: Not Currently     Frequency: Never    Drug use: unknown    Sexual activity: unknown        Review of Systems     Review of Systems   Constitutional: Negative for fever.   HENT: Negative for sore throat.    Respiratory: Positive for shortness of breath (worsening).    Cardiovascular: Positive for chest pain.   Gastrointestinal: Negative for abdominal pain, diarrhea, nausea and  vomiting.   Genitourinary: Negative for dysuria.   Musculoskeletal: Negative for back pain.   Skin: Negative for rash.   Neurological: Negative for weakness and headaches.   Hematological: Does not bruise/bleed easily.   All other systems reviewed and are negative.       Physical Exam     Initial Vitals [09/13/20 1412]   BP Pulse Resp Temp SpO2   (!) 156/87 (!) 115 (!) 26 98.4 °F (36.9 °C) (!) 85 %      MAP       --          Physical Exam  Nursing Notes and Vital Signs Reviewed.  Constitutional: Patient is in no acute distress. Well-developed and well-nourished.  Head: Atraumatic. Normocephalic.  Eyes: PERRL. EOM intact. Conjunctivae are not pale. No scleral icterus.  ENT: Mucous membranes are moist. Oropharynx is clear and symmetric.    Neck: Supple. Full ROM. No lymphadenopathy.  Cardiovascular: Tachycardic. Regular rhythm. No murmurs, rubs, or gallops. Distal pulses are 2+ and symmetric.  Pulmonary/Chest: No respiratory distress. Clear to auscultation bilaterally. No wheezing or rales.  Abdominal: Soft and non-distended.  There is no tenderness.  No rebound, guarding, or rigidity. Good bowel sounds.  Genitourinary: No CVA tenderness  Musculoskeletal: Moves all extremities. No obvious deformities. No edema. No calf tenderness.  Skin: Warm and dry.  Neurological:  Alert, awake, and appropriate.  Normal speech.  No acute focal neurological deficits are appreciated.  Psychiatric: Normal affect. Good eye contact. Appropriate in content.     ED Course   Critical Care    Date/Time: 9/13/2020 5:05 PM  Performed by: Dvae Doyle Jr., MD  Authorized by: Dave Doyle Jr., MD   Direct patient critical care time: 35 minutes  Additional history critical care time: 10 minutes  Ordering / reviewing critical care time: 5 minutes  Documentation critical care time: 5 minutes  Consulting other physicians critical care time: 5 minutes  Total critical care time (exclusive of procedural time) : 60 minutes  Critical care time was  "exclusive of separately billable procedures and treating other patients and teaching time.  Critical care was necessary to treat or prevent imminent or life-threatening deterioration of the following conditions: respiratory distress.  Critical care was time spent personally by me on the following activities: blood draw for specimens, development of treatment plan with patient or surrogate, discussions with consultants, gastric intubation, interpretation of cardiac output measurements, evaluation of patient's response to treatment, examination of patient, obtaining history from patient or surrogate, ordering and performing treatments and interventions, ordering and review of laboratory studies, ordering and review of radiographic studies, pulse oximetry and re-evaluation of patient's condition.        ED Vital Signs:  Vitals:    09/13/20 1412 09/13/20 1445 09/13/20 1500 09/13/20 1600   BP: (!) 156/87  137/87    Pulse: (!) 115 99 96 91   Resp: (!) 26  (!) 21 20   Temp: 98.4 °F (36.9 °C)      TempSrc: Oral      SpO2: (!) 85%  96% 98%   Weight: 78.5 kg (173 lb 1 oz)      Height: 5' 7" (1.702 m)       09/13/20 1601 09/13/20 1630 09/13/20 1931   BP: (!) 148/81 (!) 145/85    Pulse:  92 100   Resp:  20 20   Temp:      TempSrc:      SpO2: 98% 98% 95%   Weight:      Height:          Abnormal Lab Results:  Labs Reviewed   CBC W/ AUTO DIFFERENTIAL - Abnormal; Notable for the following components:       Result Value    Immature Granulocytes 1.0 (*)     Immature Grans (Abs) 0.05 (*)     All other components within normal limits   COMPREHENSIVE METABOLIC PANEL - Abnormal; Notable for the following components:    Albumin 2.9 (*)     AST 49 (*)     ALT 53 (*)     All other components within normal limits   TROPONIN I - Abnormal; Notable for the following components:    Troponin I 0.029 (*)     All other components within normal limits   C-REACTIVE PROTEIN - Abnormal; Notable for the following components:    CRP 89.8 (*)     All " other components within normal limits   LACTATE DEHYDROGENASE - Abnormal; Notable for the following components:     (*)     All other components within normal limits   D DIMER, QUANTITATIVE - Abnormal; Notable for the following components:    D-Dimer 0.55 (*)     All other components within normal limits   ISTAT PROCEDURE - Abnormal; Notable for the following components:    POC PH 7.469 (*)     All other components within normal limits   CULTURE, BLOOD   CULTURE, BLOOD   B-TYPE NATRIURETIC PEPTIDE   PROTIME-INR   LACTIC ACID, PLASMA   SARS-COV-2 RNA AMPLIFICATION, QUAL   CK   FERRITIN   LACTATE DEHYDROGENASE   C-REACTIVE PROTEIN   TROPONIN I   APTT   CK   HEMOGLOBIN A1C   D DIMER, QUANTITATIVE   PROCALCITONIN   SEDIMENTATION RATE   FERRITIN   POCT GLUCOSE   POCT GLUCOSE MONITORING CONTINUOUS        All Lab Results:  Results for orders placed or performed during the hospital encounter of 09/13/20   CBC auto differential   Result Value Ref Range    WBC 5.26 3.90 - 12.70 K/uL    RBC 4.65 4.00 - 5.40 M/uL    Hemoglobin 13.0 12.0 - 16.0 g/dL    Hematocrit 40.2 37.0 - 48.5 %    Mean Corpuscular Volume 87 82 - 98 fL    Mean Corpuscular Hemoglobin 28.0 27.0 - 31.0 pg    Mean Corpuscular Hemoglobin Conc 32.3 32.0 - 36.0 g/dL    RDW 14.3 11.5 - 14.5 %    Platelets 290 150 - 350 K/uL    MPV 11.9 9.2 - 12.9 fL    Immature Granulocytes 1.0 (H) 0.0 - 0.5 %    Gran # (ANC) 3.7 1.8 - 7.7 K/uL    Immature Grans (Abs) 0.05 (H) 0.00 - 0.04 K/uL    Lymph # 1.1 1.0 - 4.8 K/uL    Mono # 0.4 0.3 - 1.0 K/uL    Eos # 0.0 0.0 - 0.5 K/uL    Baso # 0.02 0.00 - 0.20 K/uL    nRBC 0 0 /100 WBC    Gran% 71.1 38.0 - 73.0 %    Lymph% 20.3 18.0 - 48.0 %    Mono% 7.0 4.0 - 15.0 %    Eosinophil% 0.2 0.0 - 8.0 %    Basophil% 0.4 0.0 - 1.9 %    Platelet Estimate Appears normal     Poik Slight     Ovalocytes Occasional     Stomatocytes Present     Differential Method Automated    Comprehensive metabolic panel   Result Value Ref Range    Sodium 140  136 - 145 mmol/L    Potassium 3.6 3.5 - 5.1 mmol/L    Chloride 101 95 - 110 mmol/L    CO2 27 23 - 29 mmol/L    Glucose 99 70 - 110 mg/dL    BUN, Bld 10 8 - 23 mg/dL    Creatinine 0.8 0.5 - 1.4 mg/dL    Calcium 8.8 8.7 - 10.5 mg/dL    Total Protein 7.3 6.0 - 8.4 g/dL    Albumin 2.9 (L) 3.5 - 5.2 g/dL    Total Bilirubin 0.5 0.1 - 1.0 mg/dL    Alkaline Phosphatase 94 55 - 135 U/L    AST 49 (H) 10 - 40 U/L    ALT 53 (H) 10 - 44 U/L    Anion Gap 12 8 - 16 mmol/L    eGFR if African American >60 >60 mL/min/1.73 m^2    eGFR if non African American >60 >60 mL/min/1.73 m^2   Troponin I   Result Value Ref Range    Troponin I 0.029 (H) 0.000 - 0.026 ng/mL   Brain natriuretic peptide   Result Value Ref Range    BNP 14 0 - 99 pg/mL   Protime-INR   Result Value Ref Range    Prothrombin Time 11.2 9.0 - 12.5 sec    INR 1.1 0.8 - 1.2   Lactic Acid, Plasma   Result Value Ref Range    Lactate (Lactic Acid) 1.1 0.5 - 2.2 mmol/L   COVID-19 Rapid Screening   Result Value Ref Range    SARS-CoV-2 RNA, Amplification, Qual Negative Negative   Troponin I   Result Value Ref Range    Troponin I 0.011 0.000 - 0.026 ng/mL   CK   Result Value Ref Range     20 - 180 U/L   C-Reactive Protein   Result Value Ref Range    CRP 89.8 (H) 0.0 - 8.2 mg/L   Lactate Dehydrogenase   Result Value Ref Range     (H) 110 - 260 U/L   D dimer, quantitative   Result Value Ref Range    D-Dimer 0.55 (H) <0.50 mg/L FEU   ISTAT PROCEDURE   Result Value Ref Range    POC PH 7.469 (H) 7.35 - 7.45    POC PCO2 38.4 35 - 45 mmHg    POC PO2 80 80 - 100 mmHg    POC HCO3 27.9 24 - 28 mmol/L    POC BE 4 -2 to 2 mmol/L    POC SATURATED O2 96 95 - 100 %    Sample ARTERIAL     Site LR     Allens Test Pass     DelSys Nasal Can     Mode SPONT     Flow 2     FiO2 28    POCT glucose   Result Value Ref Range    POCT Glucose 94 70 - 110 mg/dL         Imaging Results:  Imaging Results          X-Ray Chest AP Portable (Final result)  Result time 09/13/20 15:18:08    Final result  by Erlin Garcia MD (09/13/20 15:18:08)                 Impression:      New mild bilateral perihilar ground-glass infiltrates may reflect edema or pneumonitis.      Electronically signed by: Erlin Garcia  Date:    09/13/2020  Time:    15:18             Narrative:    EXAMINATION:  XR CHEST AP PORTABLE    CLINICAL HISTORY:  CHF;    TECHNIQUE:  Single frontal view of the chest was performed.    COMPARISON:  Chest radiograph 09/06/2020    FINDINGS:  Cardiac leads project over the chest.  Cardiomediastinal silhouette is within normal limits for size and unchanged.  Aortic atherosclerotic calcifications.  New mild bilateral perihilar ground-glass infiltrates.  No large consolidation.  No large effusion.  No pneumothorax.                                 The EKG was ordered, reviewed, and independently interpreted by the ED provider.  Interpretation time: 14:42  Rate: 99 BPM  Rhythm: normal sinus rhythm  Interpretation: No acute ST wave abnormalities. No STEMI.             The Emergency Provider reviewed the vital signs and test results, which are outlined above.     ED Discussion       4:41 PM: Discussed case with Ned Petersen NP ([facility]). Dr. Moseley agrees with current care and management of pt and accepts admission.   Admitting Service: Hospital Medicine  Admitting Physician: Dr. Moseley  Admit to: Obs tele    4:43 PM: Re-evaluated pt. I have discussed test results, shared treatment plan, and the need for admission with patient and family at bedside. Pt and family express understanding at this time and agree with all information. All questions answered. Pt and family have no further questions or concerns at this time. Pt is ready for admit.             Medical Decision Making:   Initial Assessment:   65-year-old female with a recent diagnosis of COVID presents with shortness of breath, tachycardia, hypoxia and chest pain.  A full workup was obtained.  Patient had an elevated troponin.  She was started on  heparin cardiology was consulted and is being admitted at this time to the hospitalist service.  Clinical Tests:   Lab Tests: Ordered and Reviewed  Radiological Study: Ordered and Reviewed  Medical Tests: Ordered and Reviewed           ED Medication(s):  Medications   heparin 25,000 units in dextrose 5% (100 units/ml) IV bolus from bag INITIAL BOLUS (max bolus 4000 units) (has no administration in time range)   heparin 25,000 units in dextrose 5% 250 mL (100 units/mL) infusion LOW INTENSITY nomogram - [facility] (has no administration in time range)   heparin 25,000 units in dextrose 5% (100 units/ml) IV bolus from bag - ADDITIONAL PRN BOLUS - 60 units/kg (max bolus 4000 units) (has no administration in time range)   heparin 25,000 units in dextrose 5% (100 units/ml) IV bolus from bag - ADDITIONAL PRN BOLUS - 30 units/kg (max bolus 4000 units) (has no administration in time range)   pravastatin tablet 40 mg (has no administration in time range)   losartan tablet 50 mg (has no administration in time range)   aspirin chewable tablet 81 mg (has no administration in time range)   metoprolol tartrate (LOPRESSOR) tablet 25 mg (has no administration in time range)   glucose chewable tablet 16 g (has no administration in time range)   glucose chewable tablet 24 g (has no administration in time range)   dextrose 50% injection 12.5 g (has no administration in time range)   dextrose 50% injection 25 g (has no administration in time range)   glucagon (human recombinant) injection 1 mg (has no administration in time range)   insulin aspart U-100 pen 1-10 Units (has no administration in time range)   sodium chloride 0.9% flush 10 mL (has no administration in time range)   dexAMETHasone tablet 6 mg (has no administration in time range)   ondansetron injection 4 mg (has no administration in time range)   azithromycin tablet 500 mg (500 mg Oral Given 9/13/20 1946)   ascorbic acid (vitamin C) tablet 500 mg (has no administration in time  range)   vitamin D 1000 units tablet 1,000 Units (has no administration in time range)   multivitamin tablet (has no administration in time range)   albuterol inhaler 2 puff (2 puffs Inhalation Given 9/13/20 1931)   acetaminophen tablet 650 mg (has no administration in time range)   benzonatate capsule 100 mg (has no administration in time range)   aspirin tablet 325 mg (325 mg Oral Given 9/13/20 1946)       New Prescriptions    No medications on file               Scribe Attestation:   Scribe #1: I performed the above scribed service and the documentation accurately describes the services I performed. I attest to the accuracy of the note.     Attending:   Physician Attestation Statement for Scribe #1: I, Dave Doyle Jr., MD, personally performed the services described in this documentation, as scribed by Connie Ocampo, in my presence, and it is both accurate and complete.           Clinical Impression       ICD-10-CM ICD-9-CM   1. SOB (shortness of breath)  R06.02 786.05   2. Shortness of breath  R06.02 786.05   3. Suspected Covid-19 Virus Infection  R68.89    4. Chest pain, unspecified type  R07.9 786.50   5. COVID-19 virus infection  U07.1    6. Hypoxia  R09.02 799.02   7. Chest pain  R07.9 786.50       Disposition:   Disposition: Placed in Observation  Condition: Fair         Dave Doyle Jr., MD  09/13/20 1956

## 2020-09-13 NOTE — ASSESSMENT & PLAN NOTE
- COVID-19 testing - tested positive on 9/6/20- repeat test in ED negative  -chest xray showed new mild bilateral perihilar ground-glass infiltrates which may reflect edema or pneumonitis  - Infection Control notified     - Isolation:   - Airborne, Contact and Droplet Precautions  - Cohort patients into COVID units  - N95 mask, wear eye protection  - 20 second hand hygiene              - Limit visitors per hospital policy              - Consolidating lab draws, nursing care, provider visits, and interventions    - Diagnostics: (leukopenia, hyponatremia, hyperferritinemia, elevated troponin, elevated d-dimer, age, and comorbidities are significant predictors of poor clinical outcome)  CBC, CMP, Ferritin, CRP, LDH, BNP, Troponin and Portable CXR    - Management:  Supplemental O2 to maintain SpO2 >92%  Telemetry  Continuous/intermittent Pulse Ox  Albuterol treatment PRN    Advance Care Planning   Full Code

## 2020-09-13 NOTE — ED NOTES
Pt AAOx3, resting in bed, side rails up x 2, call bell within reach. NAD at this time. Will continue to monitor. Daughter at bedside.

## 2020-09-13 NOTE — ED NOTES
Contacted lab and informed them of add-on's for PTT, CRP, Ferritin, and LDH. Lab states understanding.

## 2020-09-13 NOTE — ED NOTES
Pt c/o worsening SOB x 1 week. Reports she tested Covid positive on 9-6-20 and running intermittent fevers at home. Took tylenol PTA. Denies n/v/d, CP, HA, numbness, tingling, vision changes or any other symptoms at this time.    Patient moved to ED room 16, patient assisted onto stretcher and changed into a gown. Patient placed on cardiac monitor, continuous pulse oximetry and automatic blood pressure cuff. Bed placed in low locked position, side rails up x 2, call light is within reach of patient or family, orientation to room and explanation of wait provided to family and patient, alarms set and turned on for monitor and pulse ox, awaiting MD evaluation and orders, will continue to monitor.    Patient identifies self as Yareli Mathew    LOC: The patient is awake, alert and aware of environment with an appropriate affect, the patient is oriented x 3 and speaking appropriately.  APPEARANCE: Patient resting comfortably and in no acute distress, patient is clean and well groomed, patient's clothing is properly fastened.  SKIN: The skin is warm and dry, color consistent with ethnicity, patient has normal skin turgor and moist mucus membranes, skin intact, no breakdown or bruising noted.  MUSCULOSKELETAL: Patient moving all extremities well, no obvious swelling or deformities noted, generalized weakness.  RESPIRATORY: Airway is open and patent, respirations are spontaneous, patient has an increased effort and rate, no accessory muscle use noted.  CARDIAC: Patient has an increased rate, normal rhythm, no peripheral edema noted, capillary refill < 3 seconds.  ABDOMEN: Soft and non tender to palpation, no distention noted.  NEUROLOGIC: PERRL, eyes open spontaneously, behavior appropriate to situation, follows commands, facial expression symmetrical, bilateral hand grasp equal and even, purposeful motor response noted, normal sensation in all extremities when touched with a finger.

## 2020-09-14 PROBLEM — J18.9 PNEUMONIA: Status: ACTIVE | Noted: 2020-09-14

## 2020-09-14 LAB
ALBUMIN SERPL BCP-MCNC: 2.4 G/DL (ref 3.5–5.2)
ALP SERPL-CCNC: 80 U/L (ref 55–135)
ALT SERPL W/O P-5'-P-CCNC: 41 U/L (ref 10–44)
ANION GAP SERPL CALC-SCNC: 12 MMOL/L (ref 8–16)
AORTIC ROOT ANNULUS: 2.78 CM
APTT BLDCRRT: 21.7 SEC (ref 21–32)
APTT BLDCRRT: 59 SEC (ref 21–32)
APTT BLDCRRT: 68.8 SEC (ref 21–32)
APTT BLDCRRT: 68.8 SEC (ref 21–32)
ASCENDING AORTA: 2.76 CM
AST SERPL-CCNC: 39 U/L (ref 10–40)
AV INDEX (PROSTH): 0.77
AV MEAN GRADIENT: 6 MMHG
AV PEAK GRADIENT: 9 MMHG
AV VALVE AREA: 2.21 CM2
AV VELOCITY RATIO: 0.81
BASOPHILS # BLD AUTO: 0.01 K/UL (ref 0–0.2)
BASOPHILS NFR BLD: 0.2 % (ref 0–1.9)
BILIRUB SERPL-MCNC: 0.4 MG/DL (ref 0.1–1)
BSA FOR ECHO PROCEDURE: 1.93 M2
BUN SERPL-MCNC: 9 MG/DL (ref 8–23)
CALCIUM SERPL-MCNC: 8.7 MG/DL (ref 8.7–10.5)
CHLORIDE SERPL-SCNC: 103 MMOL/L (ref 95–110)
CHOLEST SERPL-MCNC: 81 MG/DL (ref 120–199)
CHOLEST/HDLC SERPL: 2.7 {RATIO} (ref 2–5)
CO2 SERPL-SCNC: 26 MMOL/L (ref 23–29)
CREAT SERPL-MCNC: 0.8 MG/DL (ref 0.5–1.4)
CV ECHO LV RWT: 0.62 CM
DIFFERENTIAL METHOD: ABNORMAL
DOP CALC AO PEAK VEL: 1.46 M/S
DOP CALC AO VTI: 30.78 CM
DOP CALC LVOT AREA: 2.9 CM2
DOP CALC LVOT DIAMETER: 1.91 CM
DOP CALC LVOT PEAK VEL: 1.18 M/S
DOP CALC LVOT STROKE VOLUME: 68.13 CM3
DOP CALC RVOT PEAK VEL: 0.78 M/S
DOP CALC RVOT VTI: 16.04 CM
DOP CALCLVOT PEAK VEL VTI: 23.79 CM
E WAVE DECELERATION TIME: 294.52 MSEC
E/A RATIO: 0.67
E/E' RATIO: 6.74 M/S
ECHO LV POSTERIOR WALL: 1.13 CM (ref 0.6–1.1)
EOSINOPHIL # BLD AUTO: 0 K/UL (ref 0–0.5)
EOSINOPHIL NFR BLD: 0.7 % (ref 0–8)
ERYTHROCYTE [DISTWIDTH] IN BLOOD BY AUTOMATED COUNT: 14.3 % (ref 11.5–14.5)
EST. GFR  (AFRICAN AMERICAN): >60 ML/MIN/1.73 M^2
EST. GFR  (NON AFRICAN AMERICAN): >60 ML/MIN/1.73 M^2
ESTIMATED AVG GLUCOSE: 143 MG/DL (ref 68–131)
FERRITIN SERPL-MCNC: 354 NG/ML (ref 20–300)
FRACTIONAL SHORTENING: 36 % (ref 28–44)
GIANT PLATELETS BLD QL SMEAR: PRESENT
GLUCOSE SERPL-MCNC: 103 MG/DL (ref 70–110)
HBA1C MFR BLD HPLC: 6.6 % (ref 4–5.6)
HCT VFR BLD AUTO: 36.2 % (ref 37–48.5)
HDLC SERPL-MCNC: 30 MG/DL (ref 40–75)
HDLC SERPL: 37 % (ref 20–50)
HGB BLD-MCNC: 11.9 G/DL (ref 12–16)
IMM GRANULOCYTES # BLD AUTO: 0.06 K/UL (ref 0–0.04)
IMM GRANULOCYTES NFR BLD AUTO: 1 % (ref 0–0.5)
INTERVENTRICULAR SEPTUM: 1.22 CM (ref 0.6–1.1)
IVRT: 77.07 MSEC
LA MAJOR: 4.22 CM
LA MINOR: 3.45 CM
LDLC SERPL CALC-MCNC: 27.2 MG/DL (ref 63–159)
LEFT ATRIUM SIZE: 3.42 CM
LEFT INTERNAL DIMENSION IN SYSTOLE: 2.3 CM (ref 2.1–4)
LEFT VENTRICLE DIASTOLIC VOLUME INDEX: 29 ML/M2
LEFT VENTRICLE DIASTOLIC VOLUME: 55.14 ML
LEFT VENTRICLE MASS INDEX: 73 G/M2
LEFT VENTRICLE SYSTOLIC VOLUME INDEX: 9.5 ML/M2
LEFT VENTRICLE SYSTOLIC VOLUME: 18.11 ML
LEFT VENTRICULAR INTERNAL DIMENSION IN DIASTOLE: 3.62 CM (ref 3.5–6)
LEFT VENTRICULAR MASS: 138.16 G
LV LATERAL E/E' RATIO: 6.4 M/S
LV SEPTAL E/E' RATIO: 7.11 M/S
LYMPHOCYTES # BLD AUTO: 1.6 K/UL (ref 1–4.8)
LYMPHOCYTES NFR BLD: 28.5 % (ref 18–48)
MAGNESIUM SERPL-MCNC: 1.9 MG/DL (ref 1.6–2.6)
MCH RBC QN AUTO: 28.6 PG (ref 27–31)
MCHC RBC AUTO-ENTMCNC: 32.9 G/DL (ref 32–36)
MCV RBC AUTO: 87 FL (ref 82–98)
MONOCYTES # BLD AUTO: 0.4 K/UL (ref 0.3–1)
MONOCYTES NFR BLD: 7.3 % (ref 4–15)
MV PEAK A VEL: 0.96 M/S
MV PEAK E VEL: 0.64 M/S
MV STENOSIS PRESSURE HALF TIME: 85.41 MS
MV VALVE AREA P 1/2 METHOD: 2.58 CM2
NEUTROPHILS # BLD AUTO: 3.6 K/UL (ref 1.8–7.7)
NEUTROPHILS NFR BLD: 62.3 % (ref 38–73)
NONHDLC SERPL-MCNC: 51 MG/DL
NRBC BLD-RTO: 0 /100 WBC
PHOSPHATE SERPL-MCNC: 2.4 MG/DL (ref 2.7–4.5)
PISA TR MAX VEL: 3.24 M/S
PLATELET # BLD AUTO: 294 K/UL (ref 150–350)
PLATELET BLD QL SMEAR: ABNORMAL
PMV BLD AUTO: 11.7 FL (ref 9.2–12.9)
POCT GLUCOSE: 143 MG/DL (ref 70–110)
POCT GLUCOSE: 182 MG/DL (ref 70–110)
POCT GLUCOSE: 189 MG/DL (ref 70–110)
POIKILOCYTOSIS BLD QL SMEAR: SLIGHT
POTASSIUM SERPL-SCNC: 4.1 MMOL/L (ref 3.5–5.1)
PROT SERPL-MCNC: 6.4 G/DL (ref 6–8.4)
PV MEAN GRADIENT: 1.43 MMHG
RA MAJOR: 3.48 CM
RBC # BLD AUTO: 4.16 M/UL (ref 4–5.4)
SINUS: 2.89 CM
SODIUM SERPL-SCNC: 141 MMOL/L (ref 136–145)
STJ: 2.8 CM
TDI LATERAL: 0.1 M/S
TDI SEPTAL: 0.09 M/S
TDI: 0.1 M/S
TR MAX PG: 42 MMHG
TRICUSPID ANNULAR PLANE SYSTOLIC EXCURSION: 1.9 CM
TRIGL SERPL-MCNC: 119 MG/DL (ref 30–150)
WBC # BLD AUTO: 5.72 K/UL (ref 3.9–12.7)

## 2020-09-14 PROCEDURE — 85730 THROMBOPLASTIN TIME PARTIAL: CPT | Mod: 91

## 2020-09-14 PROCEDURE — 36415 COLL VENOUS BLD VENIPUNCTURE: CPT

## 2020-09-14 PROCEDURE — 63700000 PHARM REV CODE 250 ALT 637 W/O HCPCS: Performed by: NURSE PRACTITIONER

## 2020-09-14 PROCEDURE — 25000003 PHARM REV CODE 250: Performed by: NURSE PRACTITIONER

## 2020-09-14 PROCEDURE — 80053 COMPREHEN METABOLIC PANEL: CPT

## 2020-09-14 PROCEDURE — 21400001 HC TELEMETRY ROOM

## 2020-09-14 PROCEDURE — 85730 THROMBOPLASTIN TIME PARTIAL: CPT

## 2020-09-14 PROCEDURE — 80061 LIPID PANEL: CPT

## 2020-09-14 PROCEDURE — 99222 1ST HOSP IP/OBS MODERATE 55: CPT | Mod: ,,, | Performed by: INTERNAL MEDICINE

## 2020-09-14 PROCEDURE — 94640 AIRWAY INHALATION TREATMENT: CPT

## 2020-09-14 PROCEDURE — 27000221 HC OXYGEN, UP TO 24 HOURS

## 2020-09-14 PROCEDURE — 96366 THER/PROPH/DIAG IV INF ADDON: CPT

## 2020-09-14 PROCEDURE — 94761 N-INVAS EAR/PLS OXIMETRY MLT: CPT

## 2020-09-14 PROCEDURE — 96372 THER/PROPH/DIAG INJ SC/IM: CPT

## 2020-09-14 PROCEDURE — 94760 N-INVAS EAR/PLS OXIMETRY 1: CPT

## 2020-09-14 PROCEDURE — 99222 PR INITIAL HOSPITAL CARE,LEVL II: ICD-10-PCS | Mod: ,,, | Performed by: INTERNAL MEDICINE

## 2020-09-14 PROCEDURE — 63600175 PHARM REV CODE 636 W HCPCS: Performed by: EMERGENCY MEDICINE

## 2020-09-14 PROCEDURE — 83735 ASSAY OF MAGNESIUM: CPT

## 2020-09-14 PROCEDURE — 99900035 HC TECH TIME PER 15 MIN (STAT)

## 2020-09-14 PROCEDURE — 63600175 PHARM REV CODE 636 W HCPCS: Performed by: NURSE PRACTITIONER

## 2020-09-14 PROCEDURE — 85025 COMPLETE CBC W/AUTO DIFF WBC: CPT

## 2020-09-14 PROCEDURE — 84100 ASSAY OF PHOSPHORUS: CPT

## 2020-09-14 RX ORDER — SODIUM CHLORIDE 0.9 % (FLUSH) 0.9 %
10 SYRINGE (ML) INJECTION
Status: DISCONTINUED | OUTPATIENT
Start: 2020-09-14 | End: 2020-09-15 | Stop reason: HOSPADM

## 2020-09-14 RX ADMIN — OXYCODONE HYDROCHLORIDE AND ACETAMINOPHEN 500 MG: 500 TABLET ORAL at 09:09

## 2020-09-14 RX ADMIN — OXYCODONE HYDROCHLORIDE AND ACETAMINOPHEN 500 MG: 500 TABLET ORAL at 11:09

## 2020-09-14 RX ADMIN — METOPROLOL TARTRATE 25 MG: 25 TABLET, FILM COATED ORAL at 11:09

## 2020-09-14 RX ADMIN — HEPARIN SODIUM AND DEXTROSE 15 UNITS/KG/HR: 10000; 5 INJECTION INTRAVENOUS at 04:09

## 2020-09-14 RX ADMIN — PRAVASTATIN SODIUM 40 MG: 20 TABLET ORAL at 09:09

## 2020-09-14 RX ADMIN — AZITHROMYCIN MONOHYDRATE 500 MG: 250 TABLET ORAL at 06:09

## 2020-09-14 RX ADMIN — DEXAMETHASONE 6 MG: 4 TABLET ORAL at 09:09

## 2020-09-14 RX ADMIN — ASPIRIN 81 MG CHEWABLE TABLET 81 MG: 81 TABLET CHEWABLE at 09:09

## 2020-09-14 RX ADMIN — INSULIN ASPART 2 UNITS: 100 INJECTION, SOLUTION INTRAVENOUS; SUBCUTANEOUS at 11:09

## 2020-09-14 RX ADMIN — INSULIN ASPART 2 UNITS: 100 INJECTION, SOLUTION INTRAVENOUS; SUBCUTANEOUS at 04:09

## 2020-09-14 RX ADMIN — THERA TABS 1 TABLET: TAB at 09:09

## 2020-09-14 RX ADMIN — METOPROLOL TARTRATE 25 MG: 25 TABLET, FILM COATED ORAL at 09:09

## 2020-09-14 RX ADMIN — HEPARIN SODIUM AND DEXTROSE 15 UNITS/KG/HR: 10000; 5 INJECTION INTRAVENOUS at 05:09

## 2020-09-14 RX ADMIN — ALBUTEROL SULFATE 2 PUFF: 90 AEROSOL, METERED RESPIRATORY (INHALATION) at 12:09

## 2020-09-14 RX ADMIN — ALBUTEROL SULFATE 2 PUFF: 90 AEROSOL, METERED RESPIRATORY (INHALATION) at 07:09

## 2020-09-14 RX ADMIN — BENZONATATE 100 MG: 100 CAPSULE ORAL at 11:09

## 2020-09-14 RX ADMIN — Medication 1000 UNITS: at 09:09

## 2020-09-14 RX ADMIN — LOSARTAN POTASSIUM 50 MG: 50 TABLET, FILM COATED ORAL at 09:09

## 2020-09-14 NOTE — SUBJECTIVE & OBJECTIVE
Past Medical History:   Diagnosis Date    Diabetes mellitus     HLD (hyperlipidemia)     Hypertension        History reviewed. No pertinent surgical history.    Review of patient's allergies indicates:   Allergen Reactions    Augmentin [amoxicillin-pot clavulanate] Swelling       No current facility-administered medications on file prior to encounter.      Current Outpatient Medications on File Prior to Encounter   Medication Sig    albuterol (PROVENTIL/VENTOLIN HFA) 90 mcg/actuation inhaler Inhale 2 puffs into the lungs every 4 (four) hours as needed. Take 2 puffs of the lungs every 4 hr as needed for wheezing or shortness of breath    benzonatate (TESSALON) 100 MG capsule Take 2 capsules (200 mg total) by mouth 3 (three) times daily as needed for Cough.    metFORMIN (GLUCOPHAGE) 850 MG tablet Take 750 mg by mouth once daily.    olmesartan (BENICAR) 20 MG tablet Take 20 mg by mouth once daily.    pravastatin (PRAVACHOL) 40 MG tablet Take 40 mg by mouth once daily.     Family History     None        Tobacco Use    Smoking status: Former Smoker   Substance and Sexual Activity    Alcohol use: Not Currently     Frequency: Never    Drug use: Never    Sexual activity: Not on file     Review of Systems   Unable to perform ROS: other (patient not examined given + covid status)     Objective:     Vital Signs (Most Recent):  Temp: 98.4 °F (36.9 °C) (09/14/20 0745)  Pulse: 106 (09/14/20 0745)  Resp: 20 (09/14/20 0745)  BP: (!) 154/73 (09/14/20 0308)  SpO2: (!) 94 % (09/14/20 0745) Vital Signs (24h Range):  Temp:  [98 °F (36.7 °C)-98.6 °F (37 °C)] 98.4 °F (36.9 °C)  Pulse:  [] 106  Resp:  [18-26] 20  SpO2:  [85 %-98 %] 94 %  BP: (135-164)/(73-93) 154/73     Weight: 78.5 kg (173 lb 1 oz)  Body mass index is 27.11 kg/m².    SpO2: (!) 94 %  O2 Device (Oxygen Therapy): nasal cannula    No intake or output data in the 24 hours ending 09/14/20 0808    Lines/Drains/Airways     Peripheral Intravenous Line                  Peripheral IV - Single Lumen 09/13/20 1503 20 G Left Antecubital less than 1 day         Peripheral IV - Single Lumen 09/13/20 1903 20 G Right Hand less than 1 day                Physical Exam  Not examined given + covid status  Significant Labs:   BMP:   Recent Labs   Lab 09/13/20  1455 09/14/20  0303   GLU 99 103    141   K 3.6 4.1    103   CO2 27 26   BUN 10 9   CREATININE 0.8 0.8   CALCIUM 8.8 8.7   MG  --  1.9   , CMP   Recent Labs   Lab 09/13/20  1455 09/14/20  0303    141   K 3.6 4.1    103   CO2 27 26   GLU 99 103   BUN 10 9   CREATININE 0.8 0.8   CALCIUM 8.8 8.7   PROT 7.3 6.4   ALBUMIN 2.9* 2.4*   BILITOT 0.5 0.4   ALKPHOS 94 80   AST 49* 39   ALT 53* 41   ANIONGAP 12 12   ESTGFRAFRICA >60 >60   EGFRNONAA >60 >60   , CBC   Recent Labs   Lab 09/13/20  1455 09/14/20  0303   WBC 5.26 5.72   HGB 13.0 11.9*   HCT 40.2 36.2*    294   , Troponin   Recent Labs   Lab 09/13/20  1455 09/13/20  1833   TROPONINI 0.029* 0.011    and All pertinent lab results from the last 24 hours have been reviewed.

## 2020-09-14 NOTE — CONSULTS
[facility] -   Cardiology  Consult Note    Patient Name: Yareli Mathew  MRN: [id number]  Admission Date: 9/13/2020  Hospital Length of Stay: 0 days  Code Status: Full Code   Attending Provider: Esau Lyons MD   Consulting Provider: JULIET Spivey  Primary Care Physician: Karlos Kovacs DO  Principal Problem:SOB (shortness of breath)    Patient information was obtained from patient, past medical records and ER records.     Inpatient consult to Cardiology  Consult performed by: JULIET Perez  Consult ordered by: Dave Doyle Jr., MD        Subjective:     Chief Complaint:  Shortness of breath     HPI:   Yareli Mathew is a 65 year old female who presented to [facility] due to progressively worsening shortness of breath x 1 week with associated fatigue, palpitations, intermittent chest pain. Her current medical conditions include HTN, HLP, recent COVID + on 9/6/2020.  ED workup revealed Troponin 0.029>0.011, H/H 13/40, BNP 14, K+ 3.6, Cr 0.8. She was placed in observation under the care of hospital medicine. Cardiology consulted to assist with medical management. Chart reviewed, patient not seen and examined. ECHO pending. Further recs to follow.     Past Medical History:   Diagnosis Date    Diabetes mellitus     HLD (hyperlipidemia)     Hypertension        History reviewed. No pertinent surgical history.    Review of patient's allergies indicates:   Allergen Reactions    Augmentin [amoxicillin-pot clavulanate] Swelling       No current facility-administered medications on file prior to encounter.      Current Outpatient Medications on File Prior to Encounter   Medication Sig    albuterol (PROVENTIL/VENTOLIN HFA) 90 mcg/actuation inhaler Inhale 2 puffs into the lungs every 4 (four) hours as needed. Take 2 puffs of the lungs every 4 hr as needed for wheezing or shortness of breath    benzonatate (TESSALON) 100 MG capsule Take 2 capsules (200 mg total) by mouth 3 (three)  times daily as needed for Cough.    metFORMIN (GLUCOPHAGE) 850 MG tablet Take 750 mg by mouth once daily.    olmesartan (BENICAR) 20 MG tablet Take 20 mg by mouth once daily.    pravastatin (PRAVACHOL) 40 MG tablet Take 40 mg by mouth once daily.     Family History     None        Tobacco Use    Smoking status: Former Smoker   Substance and Sexual Activity    Alcohol use: Not Currently     Frequency: Never    Drug use: Never    Sexual activity: Not on file     Review of Systems   Unable to perform ROS: other (patient not examined given + covid status)     Objective:     Vital Signs (Most Recent):  Temp: 98.4 °F (36.9 °C) (09/14/20 0745)  Pulse: 106 (09/14/20 0745)  Resp: 20 (09/14/20 0745)  BP: (!) 154/73 (09/14/20 0308)  SpO2: (!) 94 % (09/14/20 0745) Vital Signs (24h Range):  Temp:  [98 °F (36.7 °C)-98.6 °F (37 °C)] 98.4 °F (36.9 °C)  Pulse:  [] 106  Resp:  [18-26] 20  SpO2:  [85 %-98 %] 94 %  BP: (135-164)/(73-93) 154/73     Weight: 78.5 kg (173 lb 1 oz)  Body mass index is 27.11 kg/m².    SpO2: (!) 94 %  O2 Device (Oxygen Therapy): nasal cannula    No intake or output data in the 24 hours ending 09/14/20 0808    Lines/Drains/Airways     Peripheral Intravenous Line                 Peripheral IV - Single Lumen 09/13/20 1503 20 G Left Antecubital less than 1 day         Peripheral IV - Single Lumen 09/13/20 1903 20 G Right Hand less than 1 day                Physical Exam  Not examined given + covid status  Significant Labs:   BMP:   Recent Labs   Lab 09/13/20  1455 09/14/20  0303   GLU 99 103    141   K 3.6 4.1    103   CO2 27 26   BUN 10 9   CREATININE 0.8 0.8   CALCIUM 8.8 8.7   MG  --  1.9   , CMP   Recent Labs   Lab 09/13/20  1455 09/14/20  0303    141   K 3.6 4.1    103   CO2 27 26   GLU 99 103   BUN 10 9   CREATININE 0.8 0.8   CALCIUM 8.8 8.7   PROT 7.3 6.4   ALBUMIN 2.9* 2.4*   BILITOT 0.5 0.4   ALKPHOS 94 80   AST 49* 39   ALT 53* 41   ANIONGAP 12 12   ESTGFRAFRICA >60  >60   EGFRNONAA >60 >60   , CBC   Recent Labs   Lab 09/13/20  1455 09/14/20  0303   WBC 5.26 5.72   HGB 13.0 11.9*   HCT 40.2 36.2*    294   , Troponin   Recent Labs   Lab 09/13/20  1455 09/13/20  1833   TROPONINI 0.029* 0.011    and All pertinent lab results from the last 24 hours have been reviewed.        Assessment and Plan:     Suspected Covid-19 Virus Infection  Mgmt per primary team    Essential hypertension  On medical therapy  Continue BB, ARB    Elevated troponin  Check ECHO  Troponin trending downward  Likely due to demand ischemia in setting of recent Covid illness      Chest pain  Troponin 0.029>0.011  Continue IV heparin gtt for now  Check ECHO  Continue ASA, statin, BB, ARB        VTE Risk Mitigation (From admission, onward)         Ordered     IP VTE HIGH RISK PATIENT  Once      09/14/20 0139     Place sequential compression device  Until discontinued      09/13/20 1830     heparin 25,000 units in dextrose 5% 250 mL (100 units/mL) infusion LOW INTENSITY nomogram - [facility]  Continuous     Question:  Heparin Infusion Adjustment (DO NOT MODIFY ANSWER)  Answer:  \[email or web address]\Images\Pharmacy\HeparinInfusions\heparin LOW INTENSITY nomogram for [facility] [id number]    09/13/20 1706     heparin 25,000 units in dextrose 5% (100 units/ml) IV bolus from bag - ADDITIONAL PRN BOLUS - 60 units/kg (max bolus 4000 units)  As needed (PRN)     Question:  Heparin Infusion Adjustment (DO NOT MODIFY ANSWER)  Answer:  \[email or web address]\[email or web address]\Images\Pharmacy\HeparinInfusions\heparin LOW INTENSITY nomogram for [facility] [id number]    09/13/20 1706     heparin 25,000 units in dextrose 5% (100 units/ml) IV bolus from bag - ADDITIONAL PRN BOLUS - 30 units/kg (max bolus 4000 units)  As needed (PRN)     Question:  Heparin Infusion Adjustment (DO NOT MODIFY ANSWER)  Answer:  \[email or web address]\Images\Pharmacy\HeparinInfusions\heparin LOW INTENSITY nomogram for [facility] [id number]    09/13/20 1706                Thank you for your consult. I  will follow-up with patient. Please contact us if you have any additional questions.    JULIET Spivey  Cardiology   [facility] [place]

## 2020-09-14 NOTE — HOSPITAL COURSE
Pt admitted to Telemetry Unit for SOB with PNA secondary to suspected COVID 19.  Imaging supports new mild bilateral perihilar ground-glass infiltrates may reflect edema or pneumonitis.  Pt O2 sat of 85% on RA with tachypnea and TC noted.  Troponin found to be mildly elevated likely due to demand in the setting of respiratory distress.  Pt reported + COVID 19 test on 9/6/20- repeat tests negative today however pt reports worsening of symptoms.  Pt improved significantly with supplemental oxygen, Decadron, IV antibiotics, and vitamin supplements.  ID consulted.  COVID markers elevated.  Cardiology consulted and Heparin infusion initiated.Troponin normalized upon repeat evaluation.  Echo results showed EF 60% with concentric LV remodeling and Grade I (mild) LV diastolic dysfunction consistent with impaired relaxation.  Heparin infusion discontinued.  Hba1c 6.6. Pt provided with Remdesivir fact sheet and does not wish to have this treatment at this time.  Pt verbalized symptom improvement and denies CP.  Home oxygen evaluation completed and results revealed patient qualifies for home oxygen.  Social work consulted to set up home oxygen.  Vital signs stable.  Pt instructed to maintain compliance with dietary restrictions with understanding verbalized. Pt seen and examined on the date of discharge and deemed suitable for discharge to home.  Current medications resumed with Vitamin C, ASA, Azithromycin, Decadron, Lopressor, MVI, and Vitamin D prescribed.  Pulse oximeter ordered.  Pt enrolled in the COVID 19 Home Surveillance Program.  Pt instructed to follow up with PCP and cardiology upon discharge for further evaluation.

## 2020-09-14 NOTE — PLAN OF CARE
Patient transferred and oriented to telemetry unit this shift. Heparin drip hand-off performed. Patient denies pain any shortness of breath at this time. Patient agrees to contact medical staff should she have any questions, concerns, or feelings of abnormality. Plan of care reviewed with patient. Telemetry monitor box #1625 in place. Will continue to monitor and treat.

## 2020-09-14 NOTE — PLAN OF CARE
Problem: Adult Inpatient Plan of Care  Goal: Patient-Specific Goal (Individualization)  Outcome: Ongoing, Progressing       Pt AAO  SR-ST on tele   Vitals stable   Clinically indicated isolation per positive test on 9/6  IV antibx   Pt refused remdesivir treatment  Dyspnea noted on exertion. Pt does not use oxygen at home.   02 weaned from 4 LPM to 2 LPM this shift, saturating low 90's    Cards following per elevated troponins  Heparin gtt continued- current infusion rate is 15 units/ kg  Pt has had one therapeutic ptt draw, next ptt at 1718   Echo pending   BG AC and HS with SSI in place for bg greater than 151   Pt up independently with stand by assistance   Fall precautions in place   Bed alarm activated

## 2020-09-14 NOTE — HPI
Yareli Mathew is a 65 year old female who [facility] due to progressively worsening shortness of breath x 1 week with associated fatigue, palpitations, intermittent chest pain. Her current medical conditions include HTN, HLP, recent COVID + on 9/6/2020.  ED workup revealed Troponin 0.029>0.011, H/H 13/40, BNP 14, K+ 3.6, Cr 0.8. She was placed in observation under the care of hospital medicine. Cardiology consulted to assist with medical management. Chart reviewed, patient not seen and examined. ECHO pending. Further recs to follow.

## 2020-09-14 NOTE — PROGRESS NOTES
ptt collected at 1713 resulted at this time     ptt is 59. Per nomogram no change to heparin rate at this time  Heparin gtt still infusing at 15 units/kg    This ptt is the second therapeutic draw.   Next ptt in AM

## 2020-09-14 NOTE — ASSESSMENT & PLAN NOTE
Check ECHO  Troponin trending downward  Likely due to demand ischemia in setting of recent Covid illness

## 2020-09-14 NOTE — ED NOTES
Spoke with riley Duarte at lab about PTT results. Was told that they are still working on the blood work.

## 2020-09-14 NOTE — ASSESSMENT & PLAN NOTE
-resolved   Cardiology consult  Statin and ARB continued   Heparin infusion   Serial cardiac enzymes- initial troponin 0.029> 0.011   beta blocker and ASA  Nitrates prn  Oxygen therapy to maintain sats > 92 %  Lipid panel reviewed  2 D ECHO showed EF 60% with LV concentric remodeling and Grade I (mild) LV diastolic dysfunction consistent with impaired relaxation

## 2020-09-14 NOTE — SUBJECTIVE & OBJECTIVE
Interval History: pt stable upon exam and reports symptom improvement with increased SOB upon exertion.  ID consulted. Pt declined Remdesivir at this time.      Review of Systems   Constitutional: Positive for activity change, appetite change and fatigue. Negative for fever.   HENT: Negative for congestion, postnasal drip, sinus pressure, sore throat and trouble swallowing.    Eyes: Negative.    Respiratory: Positive for shortness of breath (with exertion ). Negative for cough and wheezing.    Cardiovascular: Positive for chest pain (resolved) and palpitations (resolved). Negative for leg swelling.   Gastrointestinal: Negative for abdominal distention, abdominal pain, diarrhea, nausea and vomiting.   Endocrine: Negative for cold intolerance and heat intolerance.   Genitourinary: Negative for difficulty urinating, dysuria, flank pain, frequency and urgency.   Musculoskeletal: Negative for arthralgias, back pain, gait problem, myalgias and neck pain.   Skin: Negative for color change and wound.   Allergic/Immunologic: Negative for environmental allergies and food allergies.   Neurological: Negative for dizziness, weakness and headaches.   Hematological: Does not bruise/bleed easily.   Psychiatric/Behavioral: Negative for agitation, confusion and sleep disturbance. The patient is not nervous/anxious.      Objective:     Vital Signs (Most Recent):  Temp: 99.1 °F (37.3 °C) (09/14/20 1620)  Pulse: 93 (09/14/20 1620)  Resp: 20 (09/14/20 1620)  BP: 131/85 (09/14/20 1620)  SpO2: (!) 92 % (09/14/20 1620) Vital Signs (24h Range):  Temp:  [98 °F (36.7 °C)-99.1 °F (37.3 °C)] 99.1 °F (37.3 °C)  Pulse:  [] 93  Resp:  [18-22] 20  SpO2:  [91 %-97 %] 92 %  BP: (131-164)/(73-93) 131/85     Weight: 78.5 kg (173 lb)  Body mass index is 27.1 kg/m².    Intake/Output Summary (Last 24 hours) at 9/14/2020 1718  Last data filed at 9/14/2020 1505  Gross per 24 hour   Intake 1139 ml   Output --   Net 1139 ml      Physical Exam  Vitals  signs and nursing note reviewed.   Constitutional:       General: She is not in acute distress.     Appearance: Normal appearance. She is not ill-appearing or toxic-appearing.      Interventions: Nasal cannula in place.   HENT:      Head: Normocephalic and atraumatic.      Mouth/Throat:      Pharynx: Oropharynx is clear.   Eyes:      Conjunctiva/sclera: Conjunctivae normal.   Neck:      Musculoskeletal: Normal range of motion and neck supple.   Cardiovascular:      Rate and Rhythm: Normal rate and regular rhythm.      Pulses: Normal pulses.      Heart sounds: Normal heart sounds. No murmur. No friction rub. No gallop.    Pulmonary:      Effort: Pulmonary effort is normal. No respiratory distress.      Breath sounds: Normal breath sounds. No stridor. No wheezing, rhonchi or rales.   Chest:      Chest wall: No tenderness.   Abdominal:      General: Bowel sounds are normal. There is no distension.      Palpations: Abdomen is soft. There is no mass.      Tenderness: There is no abdominal tenderness. There is no left CVA tenderness, guarding or rebound.      Hernia: No hernia is present.   Genitourinary:     Comments: Deferred   Musculoskeletal: Normal range of motion.         General: No swelling, tenderness, deformity or signs of injury.      Right lower leg: No edema.   Skin:     General: Skin is warm and dry.      Capillary Refill: Capillary refill takes less than 2 seconds.      Coloration: Skin is not jaundiced or pale.      Findings: No bruising, erythema, lesion or rash.   Neurological:      General: No focal deficit present.      Mental Status: She is oriented to person, place, and time.      Cranial Nerves: No cranial nerve deficit.      Sensory: No sensory deficit.      Motor: No weakness.      Coordination: Coordination normal.      Gait: Gait normal.      Deep Tendon Reflexes: Reflexes normal.   Psychiatric:         Mood and Affect: Mood normal.         Behavior: Behavior normal.         Significant Labs:    CBC:   Recent Labs   Lab 09/13/20  1455 09/14/20  0303   WBC 5.26 5.72   HGB 13.0 11.9*   HCT 40.2 36.2*    294     CMP:   Recent Labs   Lab 09/13/20  1455 09/14/20  0303    141   K 3.6 4.1    103   CO2 27 26   GLU 99 103   BUN 10 9   CREATININE 0.8 0.8   CALCIUM 8.8 8.7   PROT 7.3 6.4   ALBUMIN 2.9* 2.4*   BILITOT 0.5 0.4   ALKPHOS 94 80   AST 49* 39   ALT 53* 41   ANIONGAP 12 12   EGFRNONAA >60 >60     Troponin:   Recent Labs   Lab 09/13/20  1455 09/13/20  1833   TROPONINI 0.029* 0.011       Significant Imaging:   Imaging Results          X-Ray Chest AP Portable (Final result)  Result time 09/13/20 15:18:08    Final result by Erlin Garcia MD (09/13/20 15:18:08)                 Impression:      New mild bilateral perihilar ground-glass infiltrates may reflect edema or pneumonitis.      Electronically signed by: Erlin Garcia  Date:    09/13/2020  Time:    15:18             Narrative:    EXAMINATION:  XR CHEST AP PORTABLE    CLINICAL HISTORY:  CHF;    TECHNIQUE:  Single frontal view of the chest was performed.    COMPARISON:  Chest radiograph 09/06/2020    FINDINGS:  Cardiac leads project over the chest.  Cardiomediastinal silhouette is within normal limits for size and unchanged.  Aortic atherosclerotic calcifications.  New mild bilateral perihilar ground-glass infiltrates.  No large consolidation.  No large effusion.  No pneumothorax.

## 2020-09-14 NOTE — PROGRESS NOTES
[facility] Hospital Medicine  Progress Note    Patient Name: Yareli Mathew  MRN: [id number]  Patient Class: IP- Inpatient   Admission Date: 9/13/2020  Length of Stay: 0 days  Attending Physician: Esau Lyons MD  Primary Care Provider: Karlos Kovacs DO        Subjective:     Principal Problem:SOB (shortness of breath)        HPI:  Yareli Mathew is a 64 yo female with PMHx of HTN and DM who presented to ED for SOB x 1 week with progressive worsening in the setting COVID 19 (tested positive on 9/6/20).  Associated symptoms include: fatigue, decreased activity, palpitations, and CP (intermittent).  Pt denies HA, dizziness, nausea, vomiting, abdominal pain, diarrhea, and additional complaints.  Pt is a full code and sister is the surrogate decision maker.  ER work up showed: AST 49/ALT 53 and Troponin 0.029.  Chest xray showed new mild bilateral perihilar ground-glass infiltrates which may reflect edema or pneumonitis.  COVID 19 negative in ED however patient tested positive 1 week prior to admission.  ER discussed case with Cardiology and Heparin infusion initiated.  Hospital Medicine contacted for admission with patient placed in Observation Unit for further evaluation.      Overview/Hospital Course:  Pt admitted to Telemetry Unit for SOB with PNA secondary to suspected COVID 19.  Imaging supports new mild bilateral perihilar ground-glass infiltrates may reflect edema or pneumonitis.  Pt O2 sat of 85% on RA with tachypnea and TC noted.  Troponin found to be mildly elevated likely due to demand in the setting of respiratory distress.  Pt reported + COVID 19 test on 9/6/20- repeat tests negative today however pt reports worsening of symptoms.  Pt improved significantly with supplemental oxygen, Decadron, IV antibiotics, and vitamin supplements.  ID consulted.  COVID markers eleavted.  Cardiology consulted and Heparin infusion initiated.Troponin normalized upon repeat evaluation.  Echo  results showed EF 60% with concentric LV remodeling and Grade I (mild) LV diastolic dysfunction consistent with impaired relaxation.  Hba1c 6.6. Pt provided with Remdesivir fact sheet and declined treatment at this time.      Interval History: pt stable upon exam and reports symptom improvement with increased SOB upon exertion.  ID consulted. Pt declined Remdesivir at this time.      Review of Systems   Constitutional: Positive for activity change, appetite change and fatigue. Negative for fever.   HENT: Negative for congestion, postnasal drip, sinus pressure, sore throat and trouble swallowing.    Eyes: Negative.    Respiratory: Positive for shortness of breath (with exertion ). Negative for cough and wheezing.    Cardiovascular: Positive for chest pain (resolved) and palpitations (resolved). Negative for leg swelling.   Gastrointestinal: Negative for abdominal distention, abdominal pain, diarrhea, nausea and vomiting.   Endocrine: Negative for cold intolerance and heat intolerance.   Genitourinary: Negative for difficulty urinating, dysuria, flank pain, frequency and urgency.   Musculoskeletal: Negative for arthralgias, back pain, gait problem, myalgias and neck pain.   Skin: Negative for color change and wound.   Allergic/Immunologic: Negative for environmental allergies and food allergies.   Neurological: Negative for dizziness, weakness and headaches.   Hematological: Does not bruise/bleed easily.   Psychiatric/Behavioral: Negative for agitation, confusion and sleep disturbance. The patient is not nervous/anxious.      Objective:     Vital Signs (Most Recent):  Temp: 99.1 °F (37.3 °C) (09/14/20 1620)  Pulse: 93 (09/14/20 1620)  Resp: 20 (09/14/20 1620)  BP: 131/85 (09/14/20 1620)  SpO2: (!) 92 % (09/14/20 1620) Vital Signs (24h Range):  Temp:  [98 °F (36.7 °C)-99.1 °F (37.3 °C)] 99.1 °F (37.3 °C)  Pulse:  [] 93  Resp:  [18-22] 20  SpO2:  [91 %-97 %] 92 %  BP: (131-164)/(73-93) 131/85     Weight: 78.5 kg  (173 lb)  Body mass index is 27.1 kg/m².    Intake/Output Summary (Last 24 hours) at 9/14/2020 1718  Last data filed at 9/14/2020 1505  Gross per 24 hour   Intake 1139 ml   Output --   Net 1139 ml      Physical Exam  Vitals signs and nursing note reviewed.   Constitutional:       General: She is not in acute distress.     Appearance: Normal appearance. She is not ill-appearing or toxic-appearing.      Interventions: Nasal cannula in place.   HENT:      Head: Normocephalic and atraumatic.      Mouth/Throat:      Pharynx: Oropharynx is clear.   Eyes:      Conjunctiva/sclera: Conjunctivae normal.   Neck:      Musculoskeletal: Normal range of motion and neck supple.   Cardiovascular:      Rate and Rhythm: Normal rate and regular rhythm.      Pulses: Normal pulses.      Heart sounds: Normal heart sounds. No murmur. No friction rub. No gallop.    Pulmonary:      Effort: Pulmonary effort is normal. No respiratory distress.      Breath sounds: Normal breath sounds. No stridor. No wheezing, rhonchi or rales.   Chest:      Chest wall: No tenderness.   Abdominal:      General: Bowel sounds are normal. There is no distension.      Palpations: Abdomen is soft. There is no mass.      Tenderness: There is no abdominal tenderness. There is no left CVA tenderness, guarding or rebound.      Hernia: No hernia is present.   Genitourinary:     Comments: Deferred   Musculoskeletal: Normal range of motion.         General: No swelling, tenderness, deformity or signs of injury.      Right lower leg: No edema.   Skin:     General: Skin is warm and dry.      Capillary Refill: Capillary refill takes less than 2 seconds.      Coloration: Skin is not jaundiced or pale.      Findings: No bruising, erythema, lesion or rash.   Neurological:      General: No focal deficit present.      Mental Status: She is oriented to person, place, and time.      Cranial Nerves: No cranial nerve deficit.      Sensory: No sensory deficit.      Motor: No weakness.       Coordination: Coordination normal.      Gait: Gait normal.      Deep Tendon Reflexes: Reflexes normal.   Psychiatric:         Mood and Affect: Mood normal.         Behavior: Behavior normal.         Significant Labs:   CBC:   Recent Labs   Lab 09/13/20  1455 09/14/20  0303   WBC 5.26 5.72   HGB 13.0 11.9*   HCT 40.2 36.2*    294     CMP:   Recent Labs   Lab 09/13/20  1455 09/14/20  0303    141   K 3.6 4.1    103   CO2 27 26   GLU 99 103   BUN 10 9   CREATININE 0.8 0.8   CALCIUM 8.8 8.7   PROT 7.3 6.4   ALBUMIN 2.9* 2.4*   BILITOT 0.5 0.4   ALKPHOS 94 80   AST 49* 39   ALT 53* 41   ANIONGAP 12 12   EGFRNONAA >60 >60     Troponin:   Recent Labs   Lab 09/13/20  1455 09/13/20  1833   TROPONINI 0.029* 0.011       Significant Imaging:   Imaging Results          X-Ray Chest AP Portable (Final result)  Result time 09/13/20 15:18:08    Final result by Erlin Garcia MD (09/13/20 15:18:08)                 Impression:      New mild bilateral perihilar ground-glass infiltrates may reflect edema or pneumonitis.      Electronically signed by: Erlin Garcia  Date:    09/13/2020  Time:    15:18             Narrative:    EXAMINATION:  XR CHEST AP PORTABLE    CLINICAL HISTORY:  CHF;    TECHNIQUE:  Single frontal view of the chest was performed.    COMPARISON:  Chest radiograph 09/06/2020    FINDINGS:  Cardiac leads project over the chest.  Cardiomediastinal silhouette is within normal limits for size and unchanged.  Aortic atherosclerotic calcifications.  New mild bilateral perihilar ground-glass infiltrates.  No large consolidation.  No large effusion.  No pneumothorax.                                Assessment/Plan:      * SOB (shortness of breath)  -in the setting of PNA with suspected COVID 19 due to + results on 09/6/20  -supplemental oxygen   -Albuterol inhaler   -ABG results reviewed- pO2 80      Pneumonia  -in the setting of suspected COVID 19   -IV antibiotics   -imaging supports new mild  bilateral perihilar ground-glass infiltrates may reflect edema or pneumonitis  -Albuterol inhaler   -supplemental oxygen       Suspected Covid-19 Virus Infection  - COVID-19 testing - tested positive on 9/6/20- repeat test in ED negative  -chest xray showed new mild bilateral perihilar ground-glass infiltrates which may reflect edema or pneumonitis  - Infection Control notified     - Isolation:   - Airborne, Contact and Droplet Precautions  - Cohort patients into COVID units  - N95 mask, wear eye protection  - 20 second hand hygiene              - Limit visitors per hospital policy              - Consolidating lab draws, nursing care, provider visits, and interventions    - Diagnostics: (leukopenia, hyponatremia, hyperferritinemia, elevated troponin, elevated d-dimer, age, and comorbidities are significant predictors of poor clinical outcome)  CBC, CMP, Ferritin, CRP, LDH, BNP, Troponin and Portable CXR    - Management:  Supplemental O2 to maintain SpO2 >92%  Telemetry  Continuous/intermittent Pulse Ox  Albuterol treatment PRN  Decadron   -pt declined treatment with Remdesivir   Advance Care Planning   Full Code       Diabetes  -Accuchecks and SSI   -will resume cardiac/diabetic diet when appropriate   -HbA1c 6.6      Essential hypertension  -home medications resumed   -BP mildly elevated in the setting of SOB       Elevated troponin  -likely due to demand in the setting of SOB and hypoxia in COVID 19   -serial results pending- initial results 0.029>0.011  -Heparin infusion   -Echo results pending   -Cardiology consulted     Chest pain  -resolved   Cardiology consult  Statin and ARB continued   Heparin infusion   Serial cardiac enzymes- initial troponin 0.029> 0.011   beta blocker and ASA  Nitrates prn  Oxygen therapy to maintain sats > 92 %  Lipid panel reviewed  2 D ECHO showed EF 60% with LV concentric remodeling and Grade I (mild) LV diastolic dysfunction consistent with impaired relaxation            VTE Risk  Mitigation (From admission, onward)         Ordered     IP VTE HIGH RISK PATIENT  Once      09/14/20 0139     Place sequential compression device  Until discontinued      09/13/20 1830     heparin 25,000 units in dextrose 5% 250 mL (100 units/mL) infusion LOW INTENSITY nomogram - [facility]  Continuous     Question:  Heparin Infusion Adjustment (DO NOT MODIFY ANSWER)  Answer:  \\[email or web address]\epic\Images\Pharmacy\HeparinInfusions\heparin LOW INTENSITY nomogram for [facility] [id number].pdf    09/13/20 1706     heparin 25,000 units in dextrose 5% (100 units/ml) IV bolus from bag - ADDITIONAL PRN BOLUS - 60 units/kg (max bolus 4000 units)  As needed (PRN)     Question:  Heparin Infusion Adjustment (DO NOT MODIFY ANSWER)  Answer:  \[email or web address]\epic\Images\Pharmacy\HeparinInfusions\heparin LOW INTENSITY nomogram for [facility] [id number].pdf    09/13/20 1706     heparin 25,000 units in dextrose 5% (100 units/ml) IV bolus from bag - ADDITIONAL PRN BOLUS - 30 units/kg (max bolus 4000 units)  As needed (PRN)     Question:  Heparin Infusion Adjustment (DO NOT MODIFY ANSWER)  Answer:  \\[email or web address]\epic\Images\Pharmacy\HeparinInfusions\heparin LOW INTENSITY nomogram for [facility] [id number].pdf    09/13/20 1706                Discharge Planning   JERRY:      Code Status: Full Code   Is the patient medically ready for discharge?:     Reason for patient still in hospital (select all that apply): Patient trending condition, Treatment and Consult recommendations                     Barbara Ervin NP  Department of Hospital Medicine   [facility]

## 2020-09-14 NOTE — ASSESSMENT & PLAN NOTE
-likely due to demand in the setting of SOB and hypoxia in COVID 19   -serial results pending- initial results 0.029>0.011  -Heparin infusion   -Echo results pending   -Cardiology consulted

## 2020-09-14 NOTE — ASSESSMENT & PLAN NOTE
-in the setting of PNA with suspected COVID 19 due to + results on 09/6/20  -supplemental oxygen   -Albuterol inhaler   -ABG results reviewed- pO2 80

## 2020-09-14 NOTE — ED NOTES
Called lab in regards to PTT levels. Was told to redraw 2 purple tops and 1 gold top. Will redraw levels before sending pt upstairs.

## 2020-09-14 NOTE — ASSESSMENT & PLAN NOTE
-in the setting of suspected COVID 19   -IV antibiotics   -imaging supports new mild bilateral perihilar ground-glass infiltrates may reflect edema or pneumonitis  -Albuterol inhaler   -supplemental oxygen

## 2020-09-14 NOTE — PROGRESS NOTES
ptt collect at 1118 resulted at this time   Ptt is 68.8   This makes her first therapeutic draw and per the heparin nomogram no change to  heparin rate. Heparin gtt to continue infusing at 15 units/kg   Next Ptt ordered for 9888

## 2020-09-14 NOTE — ASSESSMENT & PLAN NOTE
- COVID-19 testing - tested positive on 9/6/20- repeat test in ED negative  -chest xray showed new mild bilateral perihilar ground-glass infiltrates which may reflect edema or pneumonitis  - Infection Control notified     - Isolation:   - Airborne, Contact and Droplet Precautions  - Cohort patients into COVID units  - N95 mask, wear eye protection  - 20 second hand hygiene              - Limit visitors per hospital policy              - Consolidating lab draws, nursing care, provider visits, and interventions    - Diagnostics: (leukopenia, hyponatremia, hyperferritinemia, elevated troponin, elevated d-dimer, age, and comorbidities are significant predictors of poor clinical outcome)  CBC, CMP, Ferritin, CRP, LDH, BNP, Troponin and Portable CXR    - Management:  Supplemental O2 to maintain SpO2 >92%  Telemetry  Continuous/intermittent Pulse Ox  Albuterol treatment PRN  Decadron   -pt declined treatment with Remdesivir   Advance Care Planning   Full Code

## 2020-09-15 VITALS
RESPIRATION RATE: 18 BRPM | WEIGHT: 173 LBS | DIASTOLIC BLOOD PRESSURE: 66 MMHG | SYSTOLIC BLOOD PRESSURE: 131 MMHG | HEART RATE: 91 BPM | TEMPERATURE: 98 F | OXYGEN SATURATION: 93 % | HEIGHT: 67 IN | BODY MASS INDEX: 27.15 KG/M2

## 2020-09-15 LAB
ALBUMIN SERPL BCP-MCNC: 2.5 G/DL (ref 3.5–5.2)
ALP SERPL-CCNC: 77 U/L (ref 55–135)
ALT SERPL W/O P-5'-P-CCNC: 65 U/L (ref 10–44)
ANION GAP SERPL CALC-SCNC: 12 MMOL/L (ref 8–16)
APTT BLDCRRT: 58.9 SEC (ref 21–32)
AST SERPL-CCNC: 75 U/L (ref 10–40)
BILIRUB SERPL-MCNC: 0.4 MG/DL (ref 0.1–1)
BUN SERPL-MCNC: 15 MG/DL (ref 8–23)
CALCIUM SERPL-MCNC: 9.3 MG/DL (ref 8.7–10.5)
CHLORIDE SERPL-SCNC: 103 MMOL/L (ref 95–110)
CO2 SERPL-SCNC: 27 MMOL/L (ref 23–29)
CREAT SERPL-MCNC: 0.8 MG/DL (ref 0.5–1.4)
EST. GFR  (AFRICAN AMERICAN): >60 ML/MIN/1.73 M^2
EST. GFR  (NON AFRICAN AMERICAN): >60 ML/MIN/1.73 M^2
GLUCOSE SERPL-MCNC: 124 MG/DL (ref 70–110)
MAGNESIUM SERPL-MCNC: 2.1 MG/DL (ref 1.6–2.6)
PHOSPHATE SERPL-MCNC: 3.1 MG/DL (ref 2.7–4.5)
POCT GLUCOSE: 135 MG/DL (ref 70–110)
POCT GLUCOSE: 139 MG/DL (ref 70–110)
POTASSIUM SERPL-SCNC: 4.1 MMOL/L (ref 3.5–5.1)
PROT SERPL-MCNC: 6.8 G/DL (ref 6–8.4)
SODIUM SERPL-SCNC: 142 MMOL/L (ref 136–145)

## 2020-09-15 PROCEDURE — 90471 IMMUNIZATION ADMIN: CPT | Performed by: EMERGENCY MEDICINE

## 2020-09-15 PROCEDURE — 27000221 HC OXYGEN, UP TO 24 HOURS

## 2020-09-15 PROCEDURE — 84100 ASSAY OF PHOSPHORUS: CPT

## 2020-09-15 PROCEDURE — 63600175 PHARM REV CODE 636 W HCPCS: Performed by: EMERGENCY MEDICINE

## 2020-09-15 PROCEDURE — 83735 ASSAY OF MAGNESIUM: CPT

## 2020-09-15 PROCEDURE — 99232 SBSQ HOSP IP/OBS MODERATE 35: CPT | Mod: ,,, | Performed by: INTERNAL MEDICINE

## 2020-09-15 PROCEDURE — 80053 COMPREHEN METABOLIC PANEL: CPT

## 2020-09-15 PROCEDURE — 99232 PR SUBSEQUENT HOSPITAL CARE,LEVL II: ICD-10-PCS | Mod: ,,, | Performed by: INTERNAL MEDICINE

## 2020-09-15 PROCEDURE — 25000003 PHARM REV CODE 250: Performed by: NURSE PRACTITIONER

## 2020-09-15 PROCEDURE — 63600175 PHARM REV CODE 636 W HCPCS: Performed by: NURSE PRACTITIONER

## 2020-09-15 PROCEDURE — 90670 PCV13 VACCINE IM: CPT | Performed by: EMERGENCY MEDICINE

## 2020-09-15 PROCEDURE — 94640 AIRWAY INHALATION TREATMENT: CPT

## 2020-09-15 PROCEDURE — 85730 THROMBOPLASTIN TIME PARTIAL: CPT

## 2020-09-15 PROCEDURE — 99900035 HC TECH TIME PER 15 MIN (STAT)

## 2020-09-15 PROCEDURE — 94761 N-INVAS EAR/PLS OXIMETRY MLT: CPT

## 2020-09-15 RX ORDER — METOPROLOL TARTRATE 25 MG/1
25 TABLET, FILM COATED ORAL 2 TIMES DAILY
Qty: 60 TABLET | Refills: 0 | Status: SHIPPED | OUTPATIENT
Start: 2020-09-15 | End: 2020-09-29 | Stop reason: SDUPTHER

## 2020-09-15 RX ORDER — AZITHROMYCIN 500 MG/1
500 TABLET, FILM COATED ORAL DAILY
Qty: 5 TABLET | Refills: 0 | Status: SHIPPED | OUTPATIENT
Start: 2020-09-15 | End: 2020-09-20

## 2020-09-15 RX ORDER — ASCORBIC ACID 500 MG
500 TABLET ORAL 2 TIMES DAILY
Start: 2020-09-15

## 2020-09-15 RX ORDER — CHOLECALCIFEROL (VITAMIN D3) 25 MCG
1000 TABLET ORAL DAILY
Qty: 30 TABLET | Refills: 0 | Status: SHIPPED | OUTPATIENT
Start: 2020-09-16 | End: 2020-10-16

## 2020-09-15 RX ORDER — NAPROXEN SODIUM 220 MG/1
81 TABLET, FILM COATED ORAL DAILY
Qty: 30 TABLET | Refills: 0 | Status: SHIPPED | OUTPATIENT
Start: 2020-09-16 | End: 2020-10-16

## 2020-09-15 RX ORDER — DEXAMETHASONE 6 MG/1
6 TABLET ORAL DAILY
Qty: 8 TABLET | Refills: 0 | Status: SHIPPED | OUTPATIENT
Start: 2020-09-16 | End: 2020-09-24

## 2020-09-15 RX ADMIN — Medication 1000 UNITS: at 08:09

## 2020-09-15 RX ADMIN — PNEUMOCOCCAL 13-VALENT CONJUGATE VACCINE 0.5 ML: 2.2; 2.2; 2.2; 2.2; 2.2; 4.4; 2.2; 2.2; 2.2; 2.2; 2.2; 2.2; 2.2 INJECTION, SUSPENSION INTRAMUSCULAR at 04:09

## 2020-09-15 RX ADMIN — ALBUTEROL SULFATE 2 PUFF: 90 AEROSOL, METERED RESPIRATORY (INHALATION) at 01:09

## 2020-09-15 RX ADMIN — METOPROLOL TARTRATE 25 MG: 25 TABLET, FILM COATED ORAL at 08:09

## 2020-09-15 RX ADMIN — ALBUTEROL SULFATE 2 PUFF: 90 AEROSOL, METERED RESPIRATORY (INHALATION) at 07:09

## 2020-09-15 RX ADMIN — THERA TABS 1 TABLET: TAB at 08:09

## 2020-09-15 RX ADMIN — DEXAMETHASONE 6 MG: 4 TABLET ORAL at 08:09

## 2020-09-15 RX ADMIN — PRAVASTATIN SODIUM 40 MG: 20 TABLET ORAL at 08:09

## 2020-09-15 RX ADMIN — LOSARTAN POTASSIUM 50 MG: 50 TABLET, FILM COATED ORAL at 08:09

## 2020-09-15 RX ADMIN — ASPIRIN 81 MG CHEWABLE TABLET 81 MG: 81 TABLET CHEWABLE at 08:09

## 2020-09-15 RX ADMIN — ALBUTEROL SULFATE 2 PUFF: 90 AEROSOL, METERED RESPIRATORY (INHALATION) at 12:09

## 2020-09-15 RX ADMIN — OXYCODONE HYDROCHLORIDE AND ACETAMINOPHEN 500 MG: 500 TABLET ORAL at 08:09

## 2020-09-15 NOTE — PLAN OF CARE
Melissar spoke with pt for initial assessment. Pt lives with niece and nephew. Pt was independent with ADLs prior to admission. Pt denied having hh or using any dme. Pt does not having problems obtaining medication. Pt stated she drives herself to medical appointments. Pt does not receive any op services. Pt does not have an advanced directive at this time    Pt agreed to resume covid 19 home surveillance program. Asael sent secure message to NP.     PCP; Karlos Kovacs DO         09/15/20 8332   Discharge Assessment   Assessment Type Discharge Planning Assessment   Confirmed/corrected address and phone number on facesheet? Yes   Assessment information obtained from? Patient   Expected Length of Stay (days) 2   Communicated expected length of stay with patient/caregiver yes   Prior to hospitilization cognitive status: Alert/Oriented   Prior to hospitalization functional status: Independent   Current Functional Status: Independent   Facility Arrived From: home   Lives With other relative(s)  (niece and nephew)   Able to Return to Prior Arrangements yes   Is patient able to care for self after discharge? Yes   Who are your caregiver(s) and their phone number(s)? Donna Naylor ([facility]) [phone]   Patient's perception of discharge disposition home or selfcare   Readmission Within the Last 30 Days no previous admission in last 30 days   Patient currently being followed by outpatient case management? No   Patient currently receives any other outside agency services? No   Equipment Currently Used at Home none   Do you have any problems affording any of your prescribed medications? No   Is the patient taking medications as prescribed? yes   Does the patient have transportation home? Yes   Transportation Anticipated family or friend will provide   Does the patient receive services at the [facility]? No   Discharge Plan A Home with family   Discharge Plan B Home with family   DME Needed Upon Discharge  none    Patient/Family in Agreement with Plan yes

## 2020-09-15 NOTE — DISCHARGE SUMMARY
[facility] Hospital Medicine  Discharge Summary      Patient Name: Yareli Mathew  MRN: [id number]  Admission Date: 9/13/2020  Hospital Length of Stay: 1 days  Discharge Date and Time: 9/15/2020  6:51 PM  Attending Physician: Dr. Esau Lyons   Discharging Provider: Barbara Ervin NP  Primary Care Provider: Karlos Kovacs DO      HPI:   Yareli Mathew is a 64 yo female with PMHx of HTN and DM who presented to ED for SOB x 1 week with progressive worsening in the setting COVID 19 (tested positive on 9/6/20).  Associated symptoms include: fatigue, decreased activity, palpitations, and CP (intermittent).  Pt denies HA, dizziness, nausea, vomiting, abdominal pain, diarrhea, and additional complaints.  Pt is a full code and sister is the surrogate decision maker.  ER work up showed: AST 49/ALT 53 and Troponin 0.029.  Chest xray showed new mild bilateral perihilar ground-glass infiltrates which may reflect edema or pneumonitis.  COVID 19 negative in ED however patient tested positive 1 week prior to admission.  ER discussed case with Cardiology and Heparin infusion initiated.  Hospital Medicine contacted for admission with patient placed in Observation Unit for further evaluation.      * No surgery found *      Hospital Course:   Pt admitted to Telemetry Unit for SOB with PNA secondary to suspected COVID 19.  Imaging supports new mild bilateral perihilar ground-glass infiltrates may reflect edema or pneumonitis.  Pt O2 sat of 85% on RA with tachypnea and TC noted.  Troponin found to be mildly elevated likely due to demand in the setting of respiratory distress.  Pt reported + COVID 19 test on 9/6/20- repeat tests negative today however pt reports worsening of symptoms.  Pt improved significantly with supplemental oxygen, Decadron, IV antibiotics, and vitamin supplements.  ID consulted.  COVID markers elevated.  Cardiology consulted and Heparin infusion initiated.Troponin normalized upon repeat  evaluation.  Echo results showed EF 60% with concentric LV remodeling and Grade I (mild) LV diastolic dysfunction consistent with impaired relaxation.  Heparin infusion discontinued.  Hba1c 6.6. Pt provided with Remdesivir fact sheet and does not wish to have this treatment at this time.  Pt verbalized symptom improvement and denies CP.  Home oxygen evaluation completed and results revealed patient qualifies for home oxygen.  Social work consulted to set up home oxygen.  Vital signs stable.  Pt instructed to maintain compliance with dietary restrictions with understanding verbalized. Pt seen and examined on the date of discharge and deemed suitable for discharge to home.  Current medications resumed with Vitamin C, ASA, Azithromycin, Decadron, Lopressor, MVI, and Vitamin D prescribed.  Pulse oximeter ordered.  Pt enrolled in the COVID 19 Home Surveillance Program.  Pt instructed to follow up with PCP and cardiology upon discharge for further evaluation.      Consults:   Consults (From admission, onward)        Status Ordering Provider     Inpatient consult to Cardiology  Once     Provider:  Luke Mendes MD    Completed CORNELIA ZHANG JR          Final Active Diagnoses:    Diagnosis Date Noted POA    PRINCIPAL PROBLEM:  SOB (shortness of breath) [R06.02] 09/13/2020 Yes    Pneumonia [J18.9] 09/14/2020 Yes    Chest pain [R07.9] 09/13/2020 Yes    Elevated troponin [R79.89] 09/13/2020 Yes    Essential hypertension [I10] 09/13/2020 Yes    Diabetes [E11.9] 09/13/2020 Yes    Suspected Covid-19 Virus Infection [R68.89] 09/13/2020 Yes      Problems Resolved During this Admission:       Discharged Condition: stable    Disposition: Home or Self Care    Follow Up:  Follow-up Information     Karlos Kovacs DO In 2 weeks.    Why: -hospital follow up   Contact information:  [address]  [place] [address] [phone]             Lili R May, FNP-C In 2 weeks.    Specialty: Cardiology  Why: -hospital follow up and  "evaluation for outpatient stress test   Contact information:  [address] [place] [address]  [phone]                 Patient Instructions:      OXYGEN FOR HOME USE     Order Specific Question Answer Comments   Liter Flow 3    Duration Continuous    Qualifying SpO2: 84%    Testing done at: [facility]    Route nasal cannula    Device home concentrator with portable unit    Length of need (in months): 99 mos    Patient condition with qualifying saturation  COVID   Height: 5' 7" (1.702 m)    Weight: 78.5 kg (173 lb)    Does patient have medical equipment at home? none    Alternative treatment measures have been tried or considered and deemed clinically ineffective. Yes      COMMODE FOR HOME USE     Order Specific Question Answer Comments   Type: Standard    Height: 5' 7" (1.702 m)    Weight: 78.5 kg (173 lb)    Does patient have medical equipment at home? none    Length of need (1-99 months): 99      Diet Cardiac     Diet diabetic     COVID-19 Surveillance Program     Order Specific Question Answer Comments   Does patient have a smartphone? Yes    Does patient have the [facility] on their smartphone? Yes    While in surveillance program, will patient be using home oxygen? Yes      COVID-19 Surveillance Program     Order Specific Question Answer Comments   Does patient have a smartphone? Yes    Does patient have the [facility] on their smartphone? Yes    While in surveillance program, will patient be using home oxygen? Yes      Notify your health care provider if you experience any of the following:  temperature >100.4     Notify your health care provider if you experience any of the following:  persistent nausea and vomiting or diarrhea     Notify your health care provider if you experience any of the following:  increased confusion or weakness     Notify your health care provider if you experience any of the following:  persistent dizziness, light-headedness, or visual disturbances     Notify your " health care provider if you experience any of the following:  difficulty breathing or increased cough     Notify your health care provider if you experience any of the following:  severe uncontrolled pain     Activity as tolerated       Significant Diagnostic Studies: Labs: All labs within the past 24 hours have been reviewed    Pending Diagnostic Studies:     None         Medications:  Reconciled Home Medications:      Medication List      START taking these medications    ascorbic acid (vitamin C) 500 MG tablet  Commonly known as: VITAMIN C  Take 1 tablet (500 mg total) by mouth 2 (two) times daily.     aspirin 81 MG Chew  Take 1 tablet (81 mg total) by mouth once daily.     azithromycin 500 MG tablet  Commonly known as: ZITHROMAX  Take 1 tablet (500 mg total) by mouth once daily. for 5 days     dexAMETHasone 6 MG tablet  Commonly known as: DECADRON  Take 1 tablet (6 mg total) by mouth once daily. for 8 days     metoprolol tartrate 25 MG tablet  Commonly known as: LOPRESSOR  Take 1 tablet (25 mg total) by mouth 2 (two) times daily.     multivitamin Tab  Take 1 tablet by mouth once daily.     pulse oximeter device  Commonly known as: pulse oximeter  by Apply Externally route 2 (two) times a day. Use twice daily at 8 AM and 3 PM and [email or web address] as directed.     vitamin D 1000 units Tab  Commonly known as: VITAMIN D3  Take 1 tablet (1,000 Units total) by mouth once daily.        CONTINUE taking these medications    albuterol 90 mcg/actuation inhaler  Commonly known as: PROVENTIL/VENTOLIN HFA  Inhale 2 puffs into the lungs every 4 (four) hours as needed. Take 2 puffs of the lungs every 4 hr as needed for wheezing or shortness of breath     benzonatate 100 MG capsule  Commonly known as: TESSALON  Take 2 capsules (200 mg total) by mouth 3 (three) times daily as needed for Cough.     metFORMIN 850 MG tablet  Commonly known as: GLUCOPHAGE  Take 750 mg by mouth once daily.     olmesartan 20 MG  tablet  Commonly known as: BENICAR  Take 20 mg by mouth once daily.     pravastatin 40 MG tablet  Commonly known as: PRAVACHOL  Take 40 mg by mouth once daily.            Indwelling Lines/Drains at time of discharge:   Lines/Drains/Airways     None                 Time spent on the discharge of patient: > 39 minutes  Patient was seen and examined on the date of discharge and determined to be suitable for discharge.         Barbara Ervin NP  Department of Hospital Medicine  [facility]

## 2020-09-15 NOTE — SUBJECTIVE & OBJECTIVE
Interval History: will sign off, please re-consult if needed. Needs OP Stress test/evaluation.   Review of Systems   Unable to perform ROS: other (patient not examined given + covid status)     Objective:     Vital Signs (Most Recent):  Temp: 97.1 °F (36.2 °C) (09/15/20 0359)  Pulse: 82 (09/15/20 0720)  Resp: 18 (09/15/20 0720)  BP: (!) 146/81 (09/15/20 0359)  SpO2: (!) 92 % (09/15/20 0720) Vital Signs (24h Range):  Temp:  [97.1 °F (36.2 °C)-99.1 °F (37.3 °C)] 97.1 °F (36.2 °C)  Pulse:  [69-94] 82  Resp:  [18-20] 18  SpO2:  [87 %-95 %] 92 %  BP: (131-148)/(74-86) 146/81     Weight: 78.5 kg (173 lb)  Body mass index is 27.1 kg/m².     SpO2: (!) 92 %  O2 Device (Oxygen Therapy): nasal cannula      Intake/Output Summary (Last 24 hours) at 9/15/2020 0814  Last data filed at 9/15/2020 0600  Gross per 24 hour   Intake 1267.2 ml   Output --   Net 1267.2 ml       Lines/Drains/Airways     Peripheral Intravenous Line                 Peripheral IV - Single Lumen 09/13/20 1503 20 G Left Antecubital 1 day         Peripheral IV - Single Lumen 09/13/20 1903 20 G Right Hand 1 day                Physical Exam    Significant Labs:   BMP:   Recent Labs   Lab 09/13/20  1455 09/14/20  0303   GLU 99 103    141   K 3.6 4.1    103   CO2 27 26   BUN 10 9   CREATININE 0.8 0.8   CALCIUM 8.8 8.7   MG  --  1.9   , CMP   Recent Labs   Lab 09/13/20  1455 09/14/20  0303    141   K 3.6 4.1    103   CO2 27 26   GLU 99 103   BUN 10 9   CREATININE 0.8 0.8   CALCIUM 8.8 8.7   PROT 7.3 6.4   ALBUMIN 2.9* 2.4*   BILITOT 0.5 0.4   ALKPHOS 94 80   AST 49* 39   ALT 53* 41   ANIONGAP 12 12   ESTGFRAFRICA >60 >60   EGFRNONAA >60 >60   , CBC   Recent Labs   Lab 09/13/20  1455 09/14/20  0303   WBC 5.26 5.72   HGB 13.0 11.9*   HCT 40.2 36.2*    294   , Troponin   Recent Labs   Lab 09/13/20  1455 09/13/20  1833   TROPONINI 0.029* 0.011    and All pertinent lab results from the last 24 hours have been reviewed.    Significant  Imaging: Echocardiogram:   Transthoracic echo (TTE) complete (Cupid Only):   Results for orders placed or performed during the hospital encounter of 09/13/20   Echo   Result Value Ref Range    BSA 1.93 m2    TDI SEPTAL 0.09 m/s    LV LATERAL E/E' RATIO 6.40 m/s    LV SEPTAL E/E' RATIO 7.11 m/s    TDI LATERAL 0.10 m/s    LVIDD 3.62 3.5 - 6.0 cm    IVS 1.22 (A) 0.6 - 1.1 cm    PW 1.13 (A) 0.6 - 1.1 cm    Ao root annulus 2.78 cm    LVIDS 2.30 2.1 - 4.0 cm    FS 36 28 - 44 %    Sinus 2.89 cm    STJ 2.80 cm    Ascending aorta 2.76 cm    LV mass 138.16 g    LA size 3.42 cm    TAPSE 1.90 cm    Left Ventricle Relative Wall Thickness 0.62 cm    AV mean gradient 6 mmHg    AV valve area 2.21 cm2    AV Velocity Ratio 0.81     AV index (prosthetic) 0.77     MV valve area p 1/2 method 2.58 cm2    E/A ratio 0.67     Mean e' 0.10 m/s    E wave decelartion time 294.52 msec    IVRT 77.07 msec    LVOT diameter 1.91 cm    LVOT area 2.9 cm2    LVOT peak hernán 1.18 m/s    LVOT peak VTI 23.79 cm    Ao peak hernán 1.46 m/s    Ao VTI 30.78 cm    RVOT peak hernán 0.78 m/s    RVOT peak VTI 16.04 cm    LVOT stroke volume 68.13 cm3    AV peak gradient 9 mmHg    PV mean gradient 1.43 mmHg    E/E' ratio 6.74 m/s    MV Peak E Hernán 0.64 m/s    TR Max Hernán 3.24 m/s    MV stenosis pressure 1/2 time 85.41 ms    MV Peak A Hernán 0.96 m/s    LV Systolic Volume 18.11 mL    LV Systolic Volume Index 9.5 mL/m2    LV Diastolic Volume 55.14 mL    LV Diastolic Volume Index 29.00 mL/m2    LV Mass Index 73 g/m2    RA Major Axis 3.48 cm    Left Atrium Minor Axis 3.45 cm    Left Atrium Major Axis 4.22 cm    Triscuspid Valve Regurgitation Peak Gradient 42 mmHg    Narrative    · Concentric left ventricular remodeling.  · Normal left ventricular systolic function. The estimated ejection   fraction is 60%.  · Grade I (mild) left ventricular diastolic dysfunction consistent with   impaired relaxation.  · Normal right ventricular systolic function.

## 2020-09-15 NOTE — ASSESSMENT & PLAN NOTE
Troponin 0.029>0.011  Continue IV heparin gtt for now  Check ECHO  Continue ASA, statin, BB, ARB    9/15  -ECHO revealed normal EF  -Continue IV heparin gtt for 48 hours total  -Continue ASA, Statin, BB, ARB  -OP Stress test  -OP Cards follow up

## 2020-09-15 NOTE — PLAN OF CARE
Pt AAOx4. VSS. Pt remained free of falls this shift. Pt is on 2 LPM O2 with Spo2 92%. Blood glucose monitoring. No complaints of pain or discomfort. Medications administered as ordered. Pt is normal sinus on monitor. Hourly rounding completed. Pt instructed to call for assistance. POC reviewed. Pt verbalized understanding. Will continue to monitor.

## 2020-09-15 NOTE — PROGRESS NOTES
Home Oxygen Evaluation    Date Performed: 9/15/2020    1) Patient's Home O2 Sat on room air, while at rest: 84%.        If O2 sats on room air at rest are 88% or below, patient qualifies. No additional testing needed. Document N/A in steps 2 and 3. If 89% or above, complete steps 2.      2) Patient's O2 Sat on room air while exercising: NA        If O2 sats on room air while exercising remain 89% or above patient does not qualify, no further testing needed Document N/A in step 3. If O2 sats on room air while exercising are 88% or below, continue to step 3.      3) Patient's O2 Sat while exercising on O2: NA at NA LPM         (Must show improvement from #2 for patients to qualify)    If O2 sats improve on oxygen, patient qualifies for portable oxygen. If not, the patient does not qualify.

## 2020-09-15 NOTE — NURSING
Discharge instructions, medications and appointments reviewed with patient, pt verbalized understanding of all instructions given and all questions answered  PIV and Telemetry removed pt juwna well, VSS, denies any discomfort and appears to be in no distress  Pt left floor via wheelchair per hospital staff

## 2020-09-15 NOTE — HOSPITAL COURSE
9/15/2020-Patient not examined given + covid status. Echo revealed normal EF. Will sign off, please re-consult if needed.

## 2020-09-16 ENCOUNTER — NURSE TRIAGE (OUTPATIENT)
Dept: ADMINISTRATIVE | Facility: CLINIC | Age: 65
End: 2020-09-16

## 2020-09-16 ENCOUNTER — PATIENT MESSAGE (OUTPATIENT)
Dept: ADMINISTRATIVE | Facility: OTHER | Age: 65
End: 2020-09-16

## 2020-09-16 ENCOUNTER — PATIENT OUTREACH (OUTPATIENT)
Dept: ADMINISTRATIVE | Facility: CLINIC | Age: 65
End: 2020-09-16

## 2020-09-16 NOTE — TELEPHONE ENCOUNTER
"Patient escalated due to abnormal vital sign and/or symptom; however, on NT call pt reports she did not increase her home oxygen use. States "Oh no. I don't even know where to o elio that at." Denies any SOB,fever or cough at this time. Info only protocol followed and pt instructed to call OOC for any worsening of  SX.    Reason for Disposition   [1] Follow-up call to recent contact AND [2] information only call, no triage required    Protocols used: INFORMATION ONLY CALL - NO TRIAGE-A-      "

## 2020-09-16 NOTE — TELEPHONE ENCOUNTER
Contacted pt for enrollment in Covid Surveillance program. Pt verified name and . Pt able to get [employer] downloaded and consent submitted. Pt has pulse ox. Verified contact and emergency contact info. Went over surveillance program. Pt able to get VS and symptoms data submitted. All VS and symptoms WNL so no further triage required at this time. Advised pt if SpO2 ever falls between 90-92% and does not increase after a few deep breaths to contact OOC. If it falls below 90 to call 911 or get to nearest ED. Pt verbalized understanding. Gave number to OOC if symptoms worsen or any further concerns. Pt has no further concerns at this time. Will continue to monitor.    Called patient to review COVID-19 Surveillance Program enrollment process.    Smartphone: yes    [facility]: yes    Program consent: yes    Pulse oximeter status: yes    Verified emergency contacts: yes    Program Overview: Reviewed , no response process, and importance of correct emergency contacts in event that well-being check is warranted. Encouraged patient to call [phone]  to speak with an OnCall nurse, if needed.    Patient had no further questions.      Reason for Disposition   Information only question and nurse able to answer    Protocols used: INFORMATION ONLY CALL - NO TRIAGE-A-[place]

## 2020-09-16 NOTE — PATIENT INSTRUCTIONS

## 2020-09-17 ENCOUNTER — PATIENT MESSAGE (OUTPATIENT)
Dept: ADMINISTRATIVE | Facility: OTHER | Age: 65
End: 2020-09-17

## 2020-09-18 ENCOUNTER — PATIENT MESSAGE (OUTPATIENT)
Dept: ADMINISTRATIVE | Facility: OTHER | Age: 65
End: 2020-09-18

## 2020-09-19 ENCOUNTER — PATIENT MESSAGE (OUTPATIENT)
Dept: ADMINISTRATIVE | Facility: OTHER | Age: 65
End: 2020-09-19

## 2020-09-19 LAB
BACTERIA BLD CULT: NORMAL
BACTERIA BLD CULT: NORMAL

## 2020-09-20 ENCOUNTER — PATIENT MESSAGE (OUTPATIENT)
Dept: ADMINISTRATIVE | Facility: OTHER | Age: 65
End: 2020-09-20

## 2020-09-21 ENCOUNTER — PATIENT MESSAGE (OUTPATIENT)
Dept: ADMINISTRATIVE | Facility: OTHER | Age: 65
End: 2020-09-21

## 2020-09-21 NOTE — PLAN OF CARE
09/21/20 1530   Final Note   Assessment Type Final Discharge Note   Anticipated Discharge Disposition Home   Right Care Referral Info   Post Acute Recommendation No Care

## 2020-09-22 ENCOUNTER — PATIENT MESSAGE (OUTPATIENT)
Dept: ADMINISTRATIVE | Facility: OTHER | Age: 65
End: 2020-09-22

## 2020-09-23 ENCOUNTER — PATIENT MESSAGE (OUTPATIENT)
Dept: ADMINISTRATIVE | Facility: OTHER | Age: 65
End: 2020-09-23

## 2020-09-24 ENCOUNTER — PATIENT MESSAGE (OUTPATIENT)
Dept: ADMINISTRATIVE | Facility: OTHER | Age: 65
End: 2020-09-24

## 2020-09-25 ENCOUNTER — PATIENT MESSAGE (OUTPATIENT)
Dept: ADMINISTRATIVE | Facility: OTHER | Age: 65
End: 2020-09-25

## 2020-09-26 ENCOUNTER — PATIENT MESSAGE (OUTPATIENT)
Dept: ADMINISTRATIVE | Facility: OTHER | Age: 65
End: 2020-09-26

## 2020-09-27 ENCOUNTER — PATIENT MESSAGE (OUTPATIENT)
Dept: ADMINISTRATIVE | Facility: OTHER | Age: 65
End: 2020-09-27

## 2020-09-28 ENCOUNTER — PATIENT MESSAGE (OUTPATIENT)
Dept: ADMINISTRATIVE | Facility: OTHER | Age: 65
End: 2020-09-28

## 2020-09-29 ENCOUNTER — OFFICE VISIT (OUTPATIENT)
Dept: CARDIOLOGY | Facility: CLINIC | Age: 65
End: 2020-09-29
Payer: COMMERCIAL

## 2020-09-29 ENCOUNTER — PATIENT MESSAGE (OUTPATIENT)
Dept: ADMINISTRATIVE | Facility: OTHER | Age: 65
End: 2020-09-29

## 2020-09-29 VITALS
OXYGEN SATURATION: 95 % | DIASTOLIC BLOOD PRESSURE: 70 MMHG | HEIGHT: 67 IN | WEIGHT: 176.38 LBS | BODY MASS INDEX: 27.68 KG/M2 | SYSTOLIC BLOOD PRESSURE: 118 MMHG | HEART RATE: 100 BPM

## 2020-09-29 DIAGNOSIS — I10 ESSENTIAL HYPERTENSION: ICD-10-CM

## 2020-09-29 DIAGNOSIS — R06.02 SOB (SHORTNESS OF BREATH): ICD-10-CM

## 2020-09-29 DIAGNOSIS — R79.89 ELEVATED TROPONIN: Primary | ICD-10-CM

## 2020-09-29 DIAGNOSIS — R07.89 OTHER CHEST PAIN: ICD-10-CM

## 2020-09-29 DIAGNOSIS — Z20.822 SUSPECTED COVID-19 VIRUS INFECTION: ICD-10-CM

## 2020-09-29 DIAGNOSIS — Z87.891 HISTORY OF TOBACCO ABUSE: ICD-10-CM

## 2020-09-29 PROCEDURE — 1159F MED LIST DOCD IN RCRD: CPT | Mod: S$GLB,,, | Performed by: PHYSICIAN ASSISTANT

## 2020-09-29 PROCEDURE — 99214 PR OFFICE/OUTPT VISIT, EST, LEVL IV, 30-39 MIN: ICD-10-PCS | Mod: S$GLB,,, | Performed by: PHYSICIAN ASSISTANT

## 2020-09-29 PROCEDURE — 3008F BODY MASS INDEX DOCD: CPT | Mod: CPTII,S$GLB,, | Performed by: PHYSICIAN ASSISTANT

## 2020-09-29 PROCEDURE — 1101F PR PT FALLS ASSESS DOC 0-1 FALLS W/OUT INJ PAST YR: ICD-10-PCS | Mod: CPTII,S$GLB,, | Performed by: PHYSICIAN ASSISTANT

## 2020-09-29 PROCEDURE — 99999 PR PBB SHADOW E&M-EST. PATIENT-LVL IV: CPT | Mod: PBBFAC,,, | Performed by: PHYSICIAN ASSISTANT

## 2020-09-29 PROCEDURE — 1101F PT FALLS ASSESS-DOCD LE1/YR: CPT | Mod: CPTII,S$GLB,, | Performed by: PHYSICIAN ASSISTANT

## 2020-09-29 PROCEDURE — 3008F PR BODY MASS INDEX (BMI) DOCUMENTED: ICD-10-PCS | Mod: CPTII,S$GLB,, | Performed by: PHYSICIAN ASSISTANT

## 2020-09-29 PROCEDURE — 1159F PR MEDICATION LIST DOCUMENTED IN MEDICAL RECORD: ICD-10-PCS | Mod: S$GLB,,, | Performed by: PHYSICIAN ASSISTANT

## 2020-09-29 PROCEDURE — 99999 PR PBB SHADOW E&M-EST. PATIENT-LVL IV: ICD-10-PCS | Mod: PBBFAC,,, | Performed by: PHYSICIAN ASSISTANT

## 2020-09-29 PROCEDURE — 99214 OFFICE O/P EST MOD 30 MIN: CPT | Mod: S$GLB,,, | Performed by: PHYSICIAN ASSISTANT

## 2020-09-29 RX ORDER — METOPROLOL TARTRATE 25 MG/1
25 TABLET, FILM COATED ORAL 2 TIMES DAILY
Qty: 60 TABLET | Refills: 11 | Status: SHIPPED | OUTPATIENT
Start: 2020-09-29 | End: 2021-10-06

## 2020-09-29 RX ORDER — METFORMIN HYDROCHLORIDE 750 MG/1
750 TABLET, EXTENDED RELEASE ORAL
COMMUNITY
Start: 2020-08-17

## 2020-09-29 NOTE — PROGRESS NOTES
Subjective:    Patient ID:  Yareli Mathew is a 65 y.o. female who presents for follow-up of hospital follow-up      HPI   Ms. Savage Mathew is a 65 year old female patient whose current medical conditions include former tobacco abuse, HTN, hyperlipidemia, and recent COVID infection on 9/6/2020 who presents today for hospital follow-up. Patient recently hospitalized due to SOB and chest pain. Troponin noted to be mildly elevated during admission 0.029>0.011. Echo was ordered which showed normal EF and plans were made for OP MPI stress test. She returns today and states she is doing well overall. Still recovering from COVID-19 but almost feels almost back to baseline. CV wise, seems stable. No tamika chest pain, heaviness, or tightness. SOB much improved. No lightheadedness, dizziness, palpitations, near syncope, or syncope. No s/s of CHF. BP stable and controlled. Patient is compliant with her medications. No personal history of CAD or MI. Does have familial history of CAD-mother and brother had CABG in 50's.    Review of Systems   Constitution: Negative for chills, decreased appetite, fever and malaise/fatigue.   HENT: Negative for congestion, hoarse voice and sore throat.    Eyes: Negative for blurred vision and discharge.   Cardiovascular: Positive for dyspnea on exertion. Negative for chest pain, claudication, cyanosis, irregular heartbeat, leg swelling, near-syncope, orthopnea, palpitations and paroxysmal nocturnal dyspnea.   Respiratory: Positive for shortness of breath. Negative for cough, hemoptysis, snoring, sputum production and wheezing.    Endocrine: Negative for cold intolerance and heat intolerance.   Hematologic/Lymphatic: Negative for bleeding problem. Does not bruise/bleed easily.   Skin: Negative for rash.   Musculoskeletal: Negative for arthritis, back pain, joint pain, joint swelling, muscle cramps, muscle weakness and myalgias.   Gastrointestinal: Negative for abdominal pain,  "constipation, diarrhea, heartburn, melena and nausea.   Genitourinary: Negative for hematuria.   Neurological: Negative for dizziness, focal weakness, headaches, light-headedness, loss of balance, numbness, paresthesias, seizures and weakness.   Psychiatric/Behavioral: Negative for memory loss. The patient does not have insomnia.    Allergic/Immunologic: Negative for hives.     /70 (BP Location: Left arm, Patient Position: Sitting)   Pulse 100   Ht 5' 7" (1.702 m)   Wt 80 kg (176 lb 5.9 oz)   SpO2 95%   BMI 27.62 kg/m²     Objective:    Physical Exam   Constitutional: She is oriented to person, place, and time. She appears well-developed and well-nourished. No distress.   HENT:   Head: Normocephalic and atraumatic.   Eyes: Pupils are equal, round, and reactive to light. Right eye exhibits no discharge. Left eye exhibits no discharge.   Neck: Neck supple. No JVD present.   Cardiovascular: Normal rate, regular rhythm, S1 normal and S2 normal.   No murmur heard.  Pulmonary/Chest: Effort normal and breath sounds normal. No respiratory distress. She has no wheezes. She has no rales.   Abdominal: Soft. She exhibits no distension.   Musculoskeletal:         General: No edema.   Neurological: She is alert and oriented to person, place, and time.   Skin: Skin is warm and dry. She is not diaphoretic. No erythema.   Psychiatric: She has a normal mood and affect. Her behavior is normal. Thought content normal.   Nursing note and vitals reviewed.    Echo conclusions  · Concentric left ventricular remodeling.  · Normal left ventricular systolic function. The estimated ejection fraction is 60%.  · Grade I (mild) left ventricular diastolic dysfunction consistent with impaired relaxation.  · Normal right ventricular systolic function.       Assessment:       1. Elevated troponin    2. Essential hypertension    3. Suspected Covid-19 Virus Infection    4. SOB (shortness of breath)    5. Other chest pain    6. History of " tobacco abuse      Patient presents for hospital f/u. Overall doing well. Recovering well post-COVID. Still has some residual SOB. In light of her significant risk factors for CAD (HTN, hyperlipidemia, former tobacco abuse) and chest pain symptoms and elevated troponin during admission, will proceed with MPI stress test for further evaluation. Continue same CV meds.   Plan:   -MPI stress test in next two weeks  -Continue current medical management and risk factor modification  -Cardiac, low salt diet  -Increase activity level as tolerated  -Follow-up TBA pending review of results

## 2021-10-06 DIAGNOSIS — R79.89 ELEVATED TROPONIN: Primary | ICD-10-CM

## 2021-10-06 DIAGNOSIS — I10 ESSENTIAL HYPERTENSION: ICD-10-CM

## 2021-10-06 DIAGNOSIS — R06.02 SOB (SHORTNESS OF BREATH): ICD-10-CM

## 2021-10-06 DIAGNOSIS — R07.89 OTHER CHEST PAIN: ICD-10-CM

## 2021-11-10 ENCOUNTER — TELEPHONE (OUTPATIENT)
Dept: CARDIOLOGY | Facility: HOSPITAL | Age: 66
End: 2021-11-10
Payer: COMMERCIAL

## 2021-12-02 ENCOUNTER — HOSPITAL ENCOUNTER (OUTPATIENT)
Dept: RADIOLOGY | Facility: HOSPITAL | Age: 66
Discharge: HOME | End: 2021-12-02
Attending: PHYSICIAN ASSISTANT
Payer: COMMERCIAL

## 2021-12-02 ENCOUNTER — HOSPITAL ENCOUNTER (OUTPATIENT)
Dept: CARDIOLOGY | Facility: HOSPITAL | Age: 66
Discharge: HOME | End: 2021-12-02
Attending: PHYSICIAN ASSISTANT
Payer: COMMERCIAL

## 2021-12-02 DIAGNOSIS — R79.89 ELEVATED TROPONIN: ICD-10-CM

## 2021-12-02 DIAGNOSIS — R07.89 OTHER CHEST PAIN: ICD-10-CM

## 2021-12-02 DIAGNOSIS — R06.02 SOB (SHORTNESS OF BREATH): ICD-10-CM

## 2021-12-02 DIAGNOSIS — I10 ESSENTIAL HYPERTENSION: ICD-10-CM

## 2021-12-02 LAB
CV STRESS BASE HR: 63 BPM
DIASTOLIC BLOOD PRESSURE: 98 MMHG
NUC REST EJECTION FRACTION: 84
NUC STRESS EJECTION FRACTION: 71 %
OHS CV CPX 85 PERCENT MAX PREDICTED HEART RATE MALE: 125
OHS CV CPX MAX PREDICTED HEART RATE: 147
OHS CV CPX PATIENT IS FEMALE: 1
OHS CV CPX PATIENT IS MALE: 0
OHS CV CPX PEAK DIASTOLIC BLOOD PRESSURE: 95 MMHG
OHS CV CPX PEAK HEAR RATE: 112 BPM
OHS CV CPX PEAK RATE PRESSURE PRODUCT: NORMAL
OHS CV CPX PEAK SYSTOLIC BLOOD PRESSURE: 174 MMHG
OHS CV CPX PERCENT MAX PREDICTED HEART RATE ACHIEVED: 76
OHS CV CPX RATE PRESSURE PRODUCT PRESENTING: NORMAL
STRESS ECHO POST EXERCISE DUR MIN: 1 MINUTES
STRESS ECHO POST EXERCISE DUR SEC: 10 SECONDS
SYSTOLIC BLOOD PRESSURE: 167 MMHG

## 2021-12-02 PROCEDURE — A9502 TC99M TETROFOSMIN: HCPCS

## 2021-12-02 PROCEDURE — 78452 HT MUSCLE IMAGE SPECT MULT: CPT | Mod: 26,,, | Performed by: INTERNAL MEDICINE

## 2021-12-02 PROCEDURE — 78452 HT MUSCLE IMAGE SPECT MULT: CPT

## 2021-12-02 PROCEDURE — 63600175 PHARM REV CODE 636 W HCPCS: Performed by: PHYSICIAN ASSISTANT

## 2021-12-02 PROCEDURE — 93018 STRESS TEST WITH MYOCARDIAL PERFUSION (CUPID ONLY): ICD-10-PCS | Mod: ,,, | Performed by: INTERNAL MEDICINE

## 2021-12-02 PROCEDURE — 93016 CV STRESS TEST SUPVJ ONLY: CPT | Mod: ,,, | Performed by: INTERNAL MEDICINE

## 2021-12-02 PROCEDURE — 93018 CV STRESS TEST I&R ONLY: CPT | Mod: ,,, | Performed by: INTERNAL MEDICINE

## 2021-12-02 PROCEDURE — 93017 CV STRESS TEST TRACING ONLY: CPT

## 2021-12-02 PROCEDURE — 93016 STRESS TEST WITH MYOCARDIAL PERFUSION (CUPID ONLY): ICD-10-PCS | Mod: ,,, | Performed by: INTERNAL MEDICINE

## 2021-12-02 PROCEDURE — 78452 STRESS TEST WITH MYOCARDIAL PERFUSION (CUPID ONLY): ICD-10-PCS | Mod: 26,,, | Performed by: INTERNAL MEDICINE

## 2021-12-02 RX ORDER — REGADENOSON 0.08 MG/ML
0.4 INJECTION, SOLUTION INTRAVENOUS ONCE
Status: COMPLETED | OUTPATIENT
Start: 2021-12-02 | End: 2021-12-02

## 2021-12-02 RX ADMIN — REGADENOSON 0.4 MG: 0.08 INJECTION, SOLUTION INTRAVENOUS at 10:12

## 2021-12-03 ENCOUNTER — TELEPHONE (OUTPATIENT)
Dept: CARDIOLOGY | Facility: CLINIC | Age: 66
End: 2021-12-03
Payer: COMMERCIAL

## 2022-10-04 ENCOUNTER — PATIENT MESSAGE (OUTPATIENT)
Dept: RESEARCH | Facility: HOSPITAL | Age: 67
End: 2022-10-04
Payer: COMMERCIAL

## 2022-10-31 ENCOUNTER — PATIENT MESSAGE (OUTPATIENT)
Dept: CARDIOLOGY | Facility: CLINIC | Age: 67
End: 2022-10-31
Payer: COMMERCIAL

## 2025-05-09 ENCOUNTER — OFFICE VISIT (OUTPATIENT)
Dept: OPHTHALMOLOGY | Facility: CLINIC | Age: 70
End: 2025-05-09
Payer: COMMERCIAL

## 2025-05-09 DIAGNOSIS — H50.112 EXOTROPIA OF LEFT EYE: ICD-10-CM

## 2025-05-09 DIAGNOSIS — E11.9 DIABETES MELLITUS TYPE 2 WITHOUT RETINOPATHY: ICD-10-CM

## 2025-05-09 DIAGNOSIS — H53.002 AMBLYOPIA OF LEFT EYE: ICD-10-CM

## 2025-05-09 DIAGNOSIS — H25.813 COMBINED FORMS OF AGE-RELATED CATARACT OF BOTH EYES: Primary | ICD-10-CM

## 2025-05-09 PROCEDURE — 99999 PR PBB SHADOW E&M-EST. PATIENT-LVL III: CPT | Mod: PBBFAC,,, | Performed by: OPHTHALMOLOGY

## 2025-05-09 NOTE — PROGRESS NOTES
"HPI     Cataract     Additional comments: Patient presents today for a evaluation on   cataracts. Patient complaint of blurred distance and near vision worse in   left eye patient states she was "born" with a bad left eye vision has   worsened within the last year, patient states night vision is bothersome   due to glare.            Comments    1 DM          Last edited by Erendira Pierce on 5/9/2025  1:46 PM.            Assessment /Plan     For exam results, see Encounter Report.    Combined forms of age-related cataract of both eyes  Visually significant OD. Will schedule for cataract evaluation    Diabetes mellitus type 2 without retinopathy  Diabetes controlled with no diabetic retinopathy on dilated exam. Reviewed diabetic eye precautions including excellent blood sugar control, and importance of regular follow up.     Amblyopia of left eye  Exotropia of left eye  Condition stable, no therapeutic intervention necessary at this time. Will continue to monitor.    Return to clinic for A-scan, amanda, MOCT, glare test, and orders                   "